# Patient Record
Sex: MALE | Race: WHITE | NOT HISPANIC OR LATINO | Employment: OTHER | URBAN - METROPOLITAN AREA
[De-identification: names, ages, dates, MRNs, and addresses within clinical notes are randomized per-mention and may not be internally consistent; named-entity substitution may affect disease eponyms.]

---

## 2024-08-06 ENCOUNTER — TRANSFERRED RECORDS (OUTPATIENT)
Dept: HEALTH INFORMATION MANAGEMENT | Facility: CLINIC | Age: 64
End: 2024-08-06

## 2024-08-06 LAB — RETINOPATHY: NEGATIVE

## 2024-08-07 ENCOUNTER — TELEPHONE (OUTPATIENT)
Dept: CALL CENTER | Age: 64
End: 2024-08-07

## 2024-08-07 NOTE — TELEPHONE ENCOUNTER
From the payor Grid we do take [place] Medicaid    Shadia is waiting a call from Cuco's insurance to see if he is in network     Madeleine Davila Communication Facilitator on 8/7/2024 at 2:26 PM

## 2024-08-07 NOTE — TELEPHONE ENCOUNTER
[facility] Call Center    Phone Message    May a detailed message be left on voicemail: yes     Reason for Call: Other: Patients sister Shadia calling in to see Dr Wong or Dr Butler accepts [place] Medicaid. Patients eye provider would like to send patient to us for scratched cornea with possible surgery or transplant.     Please call Shadia back at [phone]. Thank you.     Action Taken: Message routed to:  [facility]    Travel Screening: Not Applicable

## 2024-08-15 ENCOUNTER — TRANSCRIBE ORDERS (OUTPATIENT)
Dept: OTHER | Age: 64
End: 2024-08-15

## 2024-08-15 DIAGNOSIS — H04.123 DRY EYES: ICD-10-CM

## 2024-08-15 DIAGNOSIS — H17.9 CORNEAL SCARRING: Primary | ICD-10-CM

## 2024-08-21 ENCOUNTER — TRANSFERRED RECORDS (OUTPATIENT)
Dept: HEALTH INFORMATION MANAGEMENT | Facility: CLINIC | Age: 64
End: 2024-08-21

## 2024-08-22 ENCOUNTER — TELEPHONE (OUTPATIENT)
Dept: OPHTHALMOLOGY | Facility: CLINIC | Age: 64
End: 2024-08-22

## 2024-08-22 NOTE — TELEPHONE ENCOUNTER
[facility] Call Center    Phone Message    May a detailed message be left on voicemail: yes     Reason for Call: Other: Pt's sister called in regards to pt's upcoming appt. She is wondering about follow up appts, surgery, etc. Due to [place] of [place]. Please call sister  back to help answer her questions     Action Taken: Other: P EYE    Travel Screening: Not Applicable     Date of Service:

## 2024-08-23 ENCOUNTER — MEDICAL CORRESPONDENCE (OUTPATIENT)
Dept: HEALTH INFORMATION MANAGEMENT | Facility: CLINIC | Age: 64
End: 2024-08-23

## 2024-08-23 NOTE — TELEPHONE ENCOUNTER
Pt sees Dr. Gutierrez for cornea concern/decrease vision for a year.    Pt states next step maybe cornea transplant and has appt with Dr. Wong September 30th.    Pt would like to have surgery while here for the visit within 1-2 weeks.    Reviewed would not be able to schedule surgery prior to exam and not likely able to add non-urgent/emergent surgery on.    Reviewed if surgery recommended in operating room- may review adding on with provider at time of visit.    Reviewed would forward to facilitator to review request-- pt would like team to be aware of surgical request.    Pt flying in from [facility] and has sister to stay with that lives locally.    Clyde Matos, RN 9:56 AM 08/23/24

## 2024-08-23 NOTE — TELEPHONE ENCOUNTER
[facility] Call Center    Phone Message    May a detailed message be left on voicemail: yes     Reason for Call: Other: Patient's sister Shadia called back after message that was left with Cuco this morning.  She believes her questions were misinterpreted and is asking to speak with the care team at [phone].  Thank you !     Action Taken: Message routed to:  [facility]     Travel Screening: Not Applicable     Date of Service:

## 2024-08-27 ENCOUNTER — TRANSFERRED RECORDS (OUTPATIENT)
Dept: HEALTH INFORMATION MANAGEMENT | Facility: CLINIC | Age: 64
End: 2024-08-27

## 2024-08-30 ENCOUNTER — TRANSFERRED RECORDS (OUTPATIENT)
Dept: HEALTH INFORMATION MANAGEMENT | Facility: CLINIC | Age: 64
End: 2024-08-30

## 2024-08-30 LAB
EJECTION FRACTION: 72 %
EJECTION FRACTION: NORMAL %

## 2024-09-04 NOTE — TELEPHONE ENCOUNTER
[facility] Call Center    Phone Message    May a detailed message be left on voicemail: yes     Reason for Call: Other: Patient's sister is calling back to check on status of encounter sent on 8/23.  She is asking for a call back at  [phone].  Thank you!      Action Taken: Message routed to:  [facility]     Travel Screening: Not Applicable     Date of Service:

## 2024-09-04 NOTE — TELEPHONE ENCOUNTER
Called and spoke to patient sister. Reviewed would not be able to schedule surgery prior to exam and not likely able to add non-urgent/emergent surgery on without exam .     Reviewed if surgery is recommended will add on if not emergent surgical scheduler will call and schedule surgery. Let her know that  is booking out for surgery.     No records on file for patient let sister know she will contact referring clinic and have them send over records.    Answered all question sister had.      Shazia dodd  9/4/2024 5:28 PM

## 2024-09-20 ENCOUNTER — TELEPHONE (OUTPATIENT)
Dept: OPHTHALMOLOGY | Facility: CLINIC | Age: 64
End: 2024-09-20
Payer: MEDICAID

## 2024-09-20 NOTE — TELEPHONE ENCOUNTER
[facility] Call Center    Phone Message    May a detailed message be left on voicemail: yes     Reason for Call: Other: Patient calling back to check on status if Dr Wong is registered with [place] Medicaid. He received a letter from insurance that said we're in network but Dr Wong is not registered [place] Medicaid and to register.    Refer back to  on 8/7 and call patient back at [phone]. Thank you.    Action Taken: Message routed to:  [facility]    Travel Screening: Not Applicable

## 2024-09-24 NOTE — TELEPHONE ENCOUNTER
[facility] Call [facility]    Phone Message    May a detailed message be left on voicemail: yes     Reason for Call: Other: Patient called checking to see if there has been an update on whether any information has been found for Dr. Wong being registered with [place] Medicaid. He would like a call back as soon as possible to discuss. Please advise.      Action Taken: Message routed to:  [facility]    Travel Screening: Not Applicable

## 2024-09-25 NOTE — TELEPHONE ENCOUNTER
"Spoke with sister-told her Prior Auth has gone thru.  I was told that \"Good afternoon!         It is my understanding, after services are rendered, the [place] will be [place] with the authorization information on the claim with a provider enrollment application attached to initiate the process of provider enrollment.         Thank you!\"    Sent this note via email to [email or web address]  "

## 2024-09-30 ENCOUNTER — DOCUMENTATION ONLY (OUTPATIENT)
Dept: OTHER | Facility: CLINIC | Age: 64
End: 2024-09-30

## 2024-09-30 ENCOUNTER — OFFICE VISIT (OUTPATIENT)
Dept: OPHTHALMOLOGY | Facility: CLINIC | Age: 64
End: 2024-09-30
Attending: OPTOMETRIST
Payer: MEDICAID

## 2024-09-30 DIAGNOSIS — H02.202 LAGOPHTHALMOS OF BOTH LOWER EYELIDS: ICD-10-CM

## 2024-09-30 DIAGNOSIS — H17.9 CORNEAL SCAR AND OPACITY: ICD-10-CM

## 2024-09-30 DIAGNOSIS — H04.123 CHRONICALLY DRY EYES, BILATERAL: Primary | ICD-10-CM

## 2024-09-30 DIAGNOSIS — H02.205 LAGOPHTHALMOS OF BOTH LOWER EYELIDS: ICD-10-CM

## 2024-09-30 DIAGNOSIS — H02.59 FLOPPY EYELID SYNDROME OF BOTH EYES: ICD-10-CM

## 2024-09-30 DIAGNOSIS — H16.213 EXPOSURE KERATOPATHY, BILATERAL: ICD-10-CM

## 2024-09-30 PROCEDURE — 99204 OFFICE O/P NEW MOD 45 MIN: CPT | Mod: 25 | Performed by: OPHTHALMOLOGY

## 2024-09-30 PROCEDURE — 68761 CLOSE TEAR DUCT OPENING: CPT | Performed by: OPHTHALMOLOGY

## 2024-09-30 PROCEDURE — G0463 HOSPITAL OUTPT CLINIC VISIT: HCPCS | Performed by: OPHTHALMOLOGY

## 2024-09-30 RX ORDER — LISINOPRIL AND HYDROCHLOROTHIAZIDE 12.5; 2 MG/1; MG/1
1 TABLET ORAL DAILY
COMMUNITY

## 2024-09-30 RX ORDER — LEVOTHYROXINE SODIUM 25 UG/1
25 TABLET ORAL DAILY
COMMUNITY

## 2024-09-30 RX ORDER — METOPROLOL TARTRATE 50 MG
50 TABLET ORAL 2 TIMES DAILY
COMMUNITY

## 2024-09-30 RX ORDER — AMLODIPINE BESYLATE AND ATORVASTATIN CALCIUM 2.5; 4 MG/1; MG/1
1 TABLET, FILM COATED ORAL DAILY
COMMUNITY

## 2024-09-30 ASSESSMENT — EXTERNAL EXAM - LEFT EYE: OS_EXAM: NORMAL

## 2024-09-30 ASSESSMENT — REFRACTION_WEARINGRX
OS_SPHERE: -4.50
OD_CYLINDER: +1.50
OS_CYLINDER: +1.50
OD_ADD: +3.00
OD_SPHERE: -0.75
OS_AXIS: 105
OS_ADD: +3.00
OD_AXIS: 028
SPECS_TYPE: PAL

## 2024-09-30 ASSESSMENT — EXTERNAL EXAM - RIGHT EYE: OD_EXAM: NORMAL

## 2024-09-30 ASSESSMENT — VISUAL ACUITY
OS_PH_CC: 20/125
CORRECTION_TYPE: GLASSES
OD_CC: 20/150
METHOD: SNELLEN - LINEAR
OD_PH_CC+: -1
OD_PH_CC: 20/80
OS_CC: 20/200

## 2024-09-30 ASSESSMENT — TONOMETRY
OD_IOP_MMHG: 21
IOP_METHOD: TONOPEN
OS_IOP_MMHG: 25

## 2024-09-30 NOTE — NURSING NOTE
"Chief Complaints and History of Present Illnesses   Patient presents with    Corneal Evaluation     Chief Complaint(s) and History of Present Illness(es)       Corneal Evaluation              Associated symptoms: dryness and eye pain (sore due to dryness).  Negative for flashes and floaters    Treatments tried: artificial tears and ointment              Comments    He notes discomfort due to dry eyes. He mostly has cloudy/dense fog vision all day per pt. He can see better on bright, mehran days. Pt notes that his eyelids were sewn shut due to corneal abrasions in 2009 which he says worked well.     Pt uses Genteal or Soothe gel at night.   Restasis BID each eye  Refresh Plus and Systane PRN each eye  Timolol-Dorzolamide BID each eye  Iyuzeh at bedtime each eye    No results found for: \"A1C\" Last A1C 8.1 a month ago. His last BS was about 115 on Friday.     Leesa HAGEN 11:28 AM September 30, 2024                        "

## 2024-09-30 NOTE — PROGRESS NOTES
"Chief complaint   Corneal scarring, referral    HPI    Cuco Travis 64 year old male     Referred by Dr. Elier Lew. Last seen on 8/6/24. Patient is s/p AMT right eye x2 with minimal improvement in ocular surface.     Interval hx 09/30/2024  Chief Complaint(s) and History of Present Illness(es)       Corneal Evaluation    Associated symptoms include dryness and eye pain (sore due to dryness).  Negative for flashes and floaters.  Treatments tried include artificial tears and ointment.             Comments    He notes discomfort due to dry eyes. He mostly has cloudy/dense fog vision all day per pt. He can see better on bright, mehran days. Pt notes that his eyelids were sewn shut due to corneal abrasions in 2009 which he says worked well.     Pt uses Genteal or Soothe gel at night.   Restasis BID each eye  Refresh Plus and Systane PRN each eye  Timolol-Dorzolamide BID each eye  Iyuzeh at bedtime each eye    No results found for: \"A1C\" Last A1C 8.1 a month ago. His last BS was about 115 on Friday.     Leesa Hagen OA 11:28 AM September 30, 2024                          Past ocular history   Chronic dry eye syndrome both eyes   Floppy eyelid syndrome both eyes   Primary open angle glaucoma (POAG) both eyes   Pseudophakia  Medicamentosa both eyes     History of \"cornea infection\" in 2008 that resulted from corneal abrasions. Required temporary tarso BOTH EYES, antibiotics, and steroid eyedrops    Prior eye surgery/laser/Trauma:    S/p trabeculectomy right eye (2009), Dr. Pisano  S/p trabeculectomy left eye with mitomycin (2010), Dr. Pisano  S/p CEIOL both eyes (2007) done at [facility] in [place], [place]    CTL wearer:No  Glasses : yes - bifocals  Family Hx of eye disease:    Father with glaucoma, macular degeneration, and detached retina   Maternal aunt with retinal detachment       PMH     Past Medical History:   Diagnosis Date    Diabetes (H)     Disorder of thyroid     HTN (hypertension)        PSH     Past " Surgical History:   Procedure Laterality Date    ANKLE SURGERY  1996    crushed with forklift    CATARACT IOL, RT/LT      HERNIA REPAIR  2004    TRABECULECTOMY         Meds     Current Outpatient Medications   Medication Sig Dispense Refill    amLODIPine-atorvastatin (CADUET) 2.5-40 MG tablet Take 1 tablet by mouth daily. Does not know dose      dulaglutide (TRULICITY) 0.75 MG/0.5ML pen Inject 0.75 mg subcutaneously every 7 days. 3.0mg qweekly      Empagliflozin (JARDIANCE PO) Take by mouth.      levothyroxine (SYNTHROID/LEVOTHROID) 25 MCG tablet Take 25 mcg by mouth daily. Pt unsure of doseage      lisinopril-hydrochlorothiazide (ZESTORETIC) 20-12.5 MG tablet Take 1 tablet by mouth daily.      metFORMIN (GLUCOPHAGE) 1000 MG tablet Take 1,000 mg by mouth 2 times daily (with meals).      metoprolol tartrate (LOPRESSOR) 50 MG tablet Take 50 mg by mouth 2 times daily. 100mg am, 50 mg qhs       No current facility-administered medications for this visit.       Labs   None    Drops Currently Taking   Genteal or Soothe gel at night.   Restasis BID each eye  Refresh Plus and Systane 3-5x/day PRN each eye  PF Timolol-Dorzolamide BID each eye  PF Lyuzeh at bedtime each eye    Assessment/Plan 09/30/2024   # Corneal neovascularization  Likely secondary to severe dry eye and medicamentosa from glaucoma drops (recently PF drops mid August 2024).   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    Plan:   -Continue Restasis twice a day both eyes   -Continue PFATs but increase to every two hours both eyes   -Continue genteal gel at night both eyes   -Switched to PF glaucoma drops August 2024 in case of medicamentosa, continue  -Recommend punctal plugs BLL -   -Discussed possible scleral lens OU - patient hesitant to try because of maintenance involved  -Discussed repair of lag ophthalmos and floppy eyelids OU - refer to oculoplastics  -will benefit from future SK with possible AMT vs DMAK for  corneal scarring after lid resolved     # Primary open angle glaucoma (POAG), both eyes   S/p trabeculectomy right eye (2009), Dr. Pisano  S/p trabeculectomy left eye with mitomycin (2010), Dr. Pisano  Intraocular pressure today 21/25  -Continue PF dorzolamide timolol twice a day both eyes   -Continue Lyuzeh at bedtime both eyes   -Continue to follow at [facility]     Follow up:  Oph:      Donovan Owens MD  Resident Physician, PGY-3  Department of Ophthalmology     Attending Physician Attestation:  Complete documentation of historical and exam elements from today's encounter can be found in the full encounter summary report (not reduplicated in this progress note).  I personally obtained the chief complaint(s) and history of present illness.  I confirmed and edited as necessary the review of systems, past medical/surgical history, family history, social history, and examination findings as documented by others; and I examined the patient myself.  I personally reviewed the relevant tests, images, and reports as documented above.  I formulated and edited as necessary the assessment and plan and discussed the findings and management plan with the patient and family. I was present for the entire procedure.  - Derik Wong MD

## 2024-10-03 ENCOUNTER — TELEPHONE (OUTPATIENT)
Dept: OPHTHALMOLOGY | Facility: CLINIC | Age: 64
End: 2024-10-03
Payer: MEDICAID

## 2024-10-03 NOTE — TELEPHONE ENCOUNTER
"Spoke with Shadia has power of  for patient, informed Shadia to make sure a \"Consent to communicate\" is filled out at visit on 10/07/24 as we do not have one in Chart as of 10/3/24. Scheduled patient as offered for : \"Eval for repair of lag ophthalmos and floppy eyelids BE\"-Per . Provided appointment details over the phone and Eye Clinic contact numbers. -Per Patient's Sister, Shadia   "

## 2024-10-03 NOTE — TELEPHONE ENCOUNTER
FUTURE VISIT INFORMATION      FUTURE VISIT INFORMATION:  Date: 10/7/24  Time: 12:15pm  Location: [facility]  REFERRAL INFORMATION:  Referring provider:     Referring providers clinic:  [facility]  Reason for visit/diagnosis  Eval for repair of lag ophthalmos and floppy eyelids BE    RECORDS REQUESTED FROM:       Clinic name Comments Records Status Imaging Status   [facility] OV/referral 9/30/24 epic

## 2024-10-07 ENCOUNTER — OFFICE VISIT (OUTPATIENT)
Dept: OPHTHALMOLOGY | Facility: CLINIC | Age: 64
End: 2024-10-07
Payer: MEDICAID

## 2024-10-07 ENCOUNTER — PRE VISIT (OUTPATIENT)
Dept: OPHTHALMOLOGY | Facility: CLINIC | Age: 64
End: 2024-10-07

## 2024-10-07 ENCOUNTER — TELEPHONE (OUTPATIENT)
Dept: OPHTHALMOLOGY | Facility: CLINIC | Age: 64
End: 2024-10-07

## 2024-10-07 DIAGNOSIS — H02.135 SENILE ECTROPION OF BOTH LOWER EYELIDS: ICD-10-CM

## 2024-10-07 DIAGNOSIS — H02.59 FLOPPY EYELID SYNDROME OF BOTH EYES: Primary | ICD-10-CM

## 2024-10-07 DIAGNOSIS — H02.422 MYOGENIC PTOSIS OF LEFT EYELID: ICD-10-CM

## 2024-10-07 DIAGNOSIS — H02.132 SENILE ECTROPION OF BOTH LOWER EYELIDS: ICD-10-CM

## 2024-10-07 PROCEDURE — 92285 EXTERNAL OCULAR PHOTOGRAPHY: CPT | Mod: GC | Performed by: OPHTHALMOLOGY

## 2024-10-07 PROCEDURE — 99214 OFFICE O/P EST MOD 30 MIN: CPT | Mod: 24 | Performed by: OPHTHALMOLOGY

## 2024-10-07 ASSESSMENT — REFRACTION_WEARINGRX
OS_SPHERE: -4.50
OS_CYLINDER: +1.50
OD_ADD: +3.00
OD_CYLINDER: +1.50
OS_AXIS: 105
OD_AXIS: 028
OS_ADD: +3.00
SPECS_TYPE: PAL
OD_SPHERE: -0.75

## 2024-10-07 ASSESSMENT — TONOMETRY
IOP_METHOD: ICARE
OS_IOP_MMHG: 30
OD_IOP_MMHG: 20
OS_IOP_MMHG: 31
IOP_METHOD: ICARE

## 2024-10-07 ASSESSMENT — VISUAL ACUITY
OD_CC: 20/70
METHOD: SNELLEN - LINEAR
CORRECTION_TYPE: GLASSES
OS_CC: 20/125
OD_CC+: -1

## 2024-10-07 ASSESSMENT — MARGIN REFLEX DISTANCE
OD_MRD1: 5
OS_MRD1: 2

## 2024-10-07 ASSESSMENT — LAGOPHTHALMOS
OS_LAGOPHTHALMOS: 0
OD_LAGOPHTHALMOS: 0

## 2024-10-07 NOTE — TELEPHONE ENCOUNTER
[facility] Call Center    Phone Message    May a detailed message be left on voicemail: yes     Reason for Call: Other: Patient's sister is calling to ask Dr. Wong's office if patient can have some of the scarring removed in the next ten days for a temporary fix and then schedule the surgery later.  She is concerned about there not being enough IV bags for surgery.  She is asking for a call back to discuss at [phone].  Thank you!      Action Taken: Message routed to:  [facility]     Travel Screening: Not Applicable     Date of Service:

## 2024-10-07 NOTE — LETTER
10/7/2024         RE:  :  MRN: Cuco Travis  1960  [id number]     Dear Dr. Derik Wong,    Thank you for asking me to see your patient, Cuco Travis, for an oculoplastic   consultation.  My assessment and plan are below.  For further details, please see my attached clinic note.      Assessment & Plan     Cuco Travis is a 64 year old male with the following diagnoses:   1. Floppy eyelid syndrome of both eyes    2. Senile ectropion of both lower eyelids    3. Myogenic ptosis of left eyelid           Referred by Dr. Wong for lagophthalmos and floppy eyelids.  He has corneal neovascularization both eyes thought secondary to severe dry eye and medicamentosa from glaucoma drops.  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 Examination showed significant laxity of both eyelids with bilateral ectropion. His ocular surfaces appear significantly irritated, as described by Dr. Wong. Suspect that he would benefit from bilateral lateral tarsal strip.     He had a sleep study and is awaiting the results of this.     No heart attack, stroke, or blood clots. No anticoagulation.     Plan:  Bilateral lower lids LATERAL TARSAL STRIP + PATRICIA FLAP  Ptosis left upper lid later if stable           Again, thank you for allowing me to participate in the care of your patient.      Sincerely,    Damian Recio MD  Department of Ophthalmology and Visual Neurosciences  [facility]    CC: Derik Wong MD  [address]  [place] [place] [address]  Via In Basket

## 2024-10-07 NOTE — NURSING NOTE
"Chief Complaints and History of Present Illnesses   Patient presents with    Consult For     Cuco Travis is being seen for a consult today by the request of Dr. Wong for lagophthalmos and floppy eyelids OU.      Chief Complaint(s) and History of Present Illness(es)       Consult For              Associated symptoms: dryness and redness.  Negative for eye pain    Treatments tried: eye drops and artificial tears    Comments: Cuco Travis is being seen for a consult today by the request of Dr. Wong for lagophthalmos and floppy eyelids OU.               Comments    Pt feels left eye is drooping more than right eye   Vision both eyes is blurry due to dryness.   Compliant with eyedrops.    Ocular Meds:  Restasis BID each eye  Refresh Plus and Systane AT PRN each eye  PF Dorzolamide Timolol BID each eye   PF Latanaprost at bedtime each eye     DM2  LBS : 115 about a week ago  No results found for: \"A1C\"     Gio Brown 11:50 AM October 7, 2024                      "

## 2024-10-07 NOTE — PROGRESS NOTES
"Chief Complaints and History of Present Illnesses   Patient presents with    Consult For     Cuco Travis is being seen for a consult today by the request of Dr. Wong for lagophthalmos and floppy eyelids OU.      Chief Complaint(s) and History of Present Illness(es)     Consult For    Associated symptoms include dryness and redness.  Negative for eye pain.    Treatments tried include eye drops and artificial tears. Additional   comments: Cuco Travis is being seen for a consult today by the request   of Dr. Wong for lagophthalmos and floppy eyelids OU.            Comments    Pt feels left eye is drooping more than right eye   Vision both eyes is blurry due to dryness.   Compliant with eyedrops.    Ocular Meds:  Restasis BID each eye  Refresh Plus and Systane AT PRN each eye  PF Dorzolamide Timolol BID each eye   PF Latanaprost at bedtime each eye     DM2  LBS : 115 about a week ago  No results found for: \"A1C\"     Gio Ho 11:50 AM October 7, 2024        He describes significant blurring of both eyes. He has occasional burning and itching of the eyes. He has dry eyes fairly often. He doesn't have significant tearing.                Assessment & Plan     Cuco Travis is a 64 year old male with the following diagnoses:   1. Floppy eyelid syndrome of both eyes    2. Senile ectropion of both lower eyelids    3. Myogenic ptosis of left eyelid           Referred by Dr. Wong for lagophthalmos and floppy eyelids.  He has corneal neovascularization both eyes thought secondary to severe dry eye and medicamentosa from glaucoma drops.  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 Examination showed significant laxity of both eyelids with bilateral ectropion. His ocular surfaces appear significantly irritated, as described by Dr. Wong. Suspect that he would benefit from bilateral lateral tarsal strip.     He had a sleep study and is awaiting the " results of this.     No heart attack, stroke, or blood clots. No anticoagulation.     Plan:  Bilateral lower lids LATERAL TARSAL STRIP + PATRICIA FLAP  Ptosis left upper lid later if stable             Wilton Barnett MD  Resident Physician, PGY-2  Department of Ophthalmology  10/07/2024 12:44 PM     Attending Physician Attestation:  Complete documentation of historical and exam elements from today's encounter can be found in the full encounter summary report (not reduplicated in this progress note).  I personally obtained the chief complaint(s) and history of present illness.  I confirmed and edited as necessary the review of systems, past medical/surgical history, family history, social history, and examination findings as documented by others; and I examined the patient myself.  I personally reviewed the relevant tests, images, and reports as documented above.  I formulated and edited as necessary the assessment and plan and discussed the findings and management plan with the patient and family. I personally reviewed the ophthalmic test(s) associated with this encounter, agree with the interpretation(s) as documented by the resident/fellow, and have edited the corresponding report(s) as necessary.   -Damian Recio MD  12:25 PM 10/7/2024    Today with Cuco Travis  and his sister, I reviewed the indications, risks, benefits, and alternatives of the proposed surgical procedure including, but not limited to, failure obtain the desired result  and need for additional surgery, bleeding, infection, loss of vision, loss of the eye, and the remote possibility of permanent damage to any organ system or death with the use of anesthesia.  I provided multiple opportunities for the questions, answered all questions to the best of my ability, and confirmed that my answers and my discussion were understood.   - Damian Recio MD 1:04 PM 10/7/2024

## 2024-10-08 NOTE — TELEPHONE ENCOUNTER
Spoke to sister at 1405 primary health care agent.    Concern that not able to perform lid surgery as previously planned for tomorrow secondary to IV bag fluid shortage.    Sister would like to review possible surgery with Dr. Wong if the lid surgery will be scheduled out further now.    Pt staying in town currently for procedure, but will need to go home ([facility]) if surgery delayed for period of time.    -- per review of last Dr. Wong Note: superficial keratectomy vs DMAK after lid resolved.    Per information I have received [facility] [place] not using conservation measures for IV bag fluid.    I am not aware surgery center holding on surgeries at this time/rescheduling if not considered urgent/emergent.    Sister also wondering if procedure paperwork was sent to [place] medicaid.    I will forward to Care Coordinator and surgery scheduling team to review/finalize surgery date with Dr. Recio.    After surgery scheduled, we can re-review plan for cornea procedure.    Sister seemed comfortable with information.    Clyde Matos RN 2:13 PM 10/08/24

## 2024-10-09 ENCOUNTER — TELEPHONE (OUTPATIENT)
Dept: OPHTHALMOLOGY | Facility: CLINIC | Age: 64
End: 2024-10-09
Payer: MEDICAID

## 2024-10-09 PROBLEM — H02.135 SENILE ECTROPION OF BOTH LOWER EYELIDS: Status: ACTIVE | Noted: 2024-10-07

## 2024-10-09 PROBLEM — H02.59 FLOPPY EYELID SYNDROME OF BOTH EYES: Status: ACTIVE | Noted: 2024-10-07

## 2024-10-09 PROBLEM — H02.132 SENILE ECTROPION OF BOTH LOWER EYELIDS: Status: ACTIVE | Noted: 2024-10-07

## 2024-10-09 NOTE — TELEPHONE ENCOUNTER
Called patient to schedule surgery with Dr. Recio    Spoke with: Shadia    Date(s) of Surgery: 11/6    Patient aware of approximate arrival time: Yes      [facility] of surgery: [facility]     Pre-Op H&P: [facility]     Informed patient that they need to call to schedule pre-op H&P within 30 days of surgery date: Yes      Post-Op Appt Dates: 11/18       Discussed with patient pre-op RN will call 2-3 days prior to surgery with arrival time and instructions:  Yes       Standard Surgery Packet Sent: Yes 10/09/24  via Mail - Standard      Additional Information Sent in Packet:  NA       Informed patient that they will need an adult  to bring patient home from surgery: Yes  : Shadia         Additional Comments:  NA      All patients questions were answered and was instructed to review surgical packet and call back [phone] with any questions or concerns.       Kezia Rider on 10/9/2024 at 9:38 AM

## 2024-10-10 NOTE — TELEPHONE ENCOUNTER
Pt's lid surgery November 6th and recommendation from Dr. Wong for lid surgery prior to any cornea procedure.      Shadia Travis [phone]     Left message at 0800 and reviewed Dr. Wong's recommendation to hold on cornea procedure until lid surgery performed    Reviewed may reach out after lid surgery to review plan for cornea procedure with Dr. Wong.    Clyde Matos RN 8:02 AM 10/10/24

## 2024-10-11 NOTE — TELEPHONE ENCOUNTER
[facility] Call Center    Phone Message    May a detailed message be left on voicemail: yes     Reason for Call: Other: Shadia is calling back to ask about the heal time from surgery with Dr. Recio and how long before Dr. Wong would be will to do the cornea scraping?  She is asking for a call back at [phone].  Thank you!      Action Taken: Message routed to:  [facility]     Travel Screening: Not Applicable     Date of Service:

## 2024-10-14 NOTE — TELEPHONE ENCOUNTER
[facility] Call Center    Phone Message    May a detailed message be left on voicemail: yes     Reason for Call: Other: Shadia is calling back with pt on the line and they are still requesting a call back to discuss dates due to they need to be scheduling flights please do call Shadia at # [phone] Thank you  Caller was advised of a month in between Dr Recio and Dr Wong but caller would like to talk with Clyde please   Can the re-exam and scrapping be scheduled now for pts schedule? Caller would like to book those if possible with Dr Wong    Action Taken: Message routed to:  [facility]    Travel Screening: Not Applicable     Date of Service:

## 2024-10-22 ENCOUNTER — TELEPHONE (OUTPATIENT)
Dept: OPHTHALMOLOGY | Facility: CLINIC | Age: 64
End: 2024-10-22
Payer: MEDICAID

## 2024-10-22 NOTE — TELEPHONE ENCOUNTER
[facility] Call Center    Phone Message    May a detailed message be left on voicemail: yes     Reason for Call: Other: [facility] called requesting pre-op form. They need to know date of surgery, what kind of surgery and if any testing is needed. Labs, EKG etc. Please fax to: [phone].     Action Taken: Other: eye    Travel Screening: Not Applicable

## 2024-10-23 NOTE — TELEPHONE ENCOUNTER
Pt LVM asking about the pre op forms. Writer called back and spoke with Shadia and let them know that writer did fax them over to the doctors office and that they are also included in the surgery packet that was sent via mail to patient on 10/9. Shadia stated that she would follow up with patient. Kezia Rider on 10/23/2024 at 1:55 PM

## 2024-10-24 ENCOUNTER — TRANSFERRED RECORDS (OUTPATIENT)
Dept: HEALTH INFORMATION MANAGEMENT | Facility: CLINIC | Age: 64
End: 2024-10-24
Payer: MEDICAID

## 2024-11-05 ENCOUNTER — ANESTHESIA EVENT (OUTPATIENT)
Dept: SURGERY | Facility: AMBULATORY SURGERY CENTER | Age: 64
End: 2024-11-05
Payer: MEDICAID

## 2024-11-06 ENCOUNTER — HOSPITAL ENCOUNTER (OUTPATIENT)
Facility: AMBULATORY SURGERY CENTER | Age: 64
Discharge: HOME | End: 2024-11-06
Attending: OPHTHALMOLOGY
Payer: MEDICAID

## 2024-11-06 ENCOUNTER — ANESTHESIA (OUTPATIENT)
Dept: SURGERY | Facility: AMBULATORY SURGERY CENTER | Age: 64
End: 2024-11-06
Payer: MEDICAID

## 2024-11-06 VITALS
HEIGHT: 67 IN | DIASTOLIC BLOOD PRESSURE: 76 MMHG | TEMPERATURE: 98.5 F | WEIGHT: 277 LBS | SYSTOLIC BLOOD PRESSURE: 109 MMHG | RESPIRATION RATE: 18 BRPM | OXYGEN SATURATION: 92 % | BODY MASS INDEX: 43.47 KG/M2 | HEART RATE: 86 BPM

## 2024-11-06 DIAGNOSIS — Z98.890 POSTOPERATIVE EYE STATE: Primary | ICD-10-CM

## 2024-11-06 LAB — GLUCOSE BLDC GLUCOMTR-MCNC: 169 MG/DL (ref 70–99)

## 2024-11-06 PROCEDURE — 82962 GLUCOSE BLOOD TEST: CPT | Performed by: PATHOLOGY

## 2024-11-06 PROCEDURE — 67914 REPAIR EYELID DEFECT: CPT | Performed by: ANESTHESIOLOGY

## 2024-11-06 PROCEDURE — 67914 REPAIR EYELID DEFECT: CPT | Performed by: NURSE ANESTHETIST, CERTIFIED REGISTERED

## 2024-11-06 RX ORDER — NEOMYCIN POLYMYXIN B SULFATES AND DEXAMETHASONE 3.5; 10000; 1 MG/ML; [USP'U]/ML; MG/ML
SUSPENSION/ DROPS OPHTHALMIC
Qty: 5 ML | Refills: 0 | Status: SHIPPED | OUTPATIENT
Start: 2024-11-06

## 2024-11-06 RX ORDER — OXYCODONE HYDROCHLORIDE 5 MG/1
10 TABLET ORAL
Status: DISCONTINUED | OUTPATIENT
Start: 2024-11-06 | End: 2024-11-07 | Stop reason: HOSPADM

## 2024-11-06 RX ORDER — LIDOCAINE 40 MG/G
CREAM TOPICAL
Status: DISCONTINUED | OUTPATIENT
Start: 2024-11-06 | End: 2024-11-07 | Stop reason: HOSPADM

## 2024-11-06 RX ORDER — FENTANYL CITRATE 50 UG/ML
50 INJECTION, SOLUTION INTRAMUSCULAR; INTRAVENOUS EVERY 5 MIN PRN
Status: DISCONTINUED | OUTPATIENT
Start: 2024-11-06 | End: 2024-11-07 | Stop reason: HOSPADM

## 2024-11-06 RX ORDER — ONDANSETRON 2 MG/ML
4 INJECTION INTRAMUSCULAR; INTRAVENOUS EVERY 30 MIN PRN
Status: DISCONTINUED | OUTPATIENT
Start: 2024-11-06 | End: 2024-11-07 | Stop reason: HOSPADM

## 2024-11-06 RX ORDER — LIDOCAINE HYDROCHLORIDE AND EPINEPHRINE 10; 10 MG/ML; UG/ML
INJECTION, SOLUTION INFILTRATION; PERINEURAL PRN
Status: DISCONTINUED | OUTPATIENT
Start: 2024-11-06 | End: 2024-11-06 | Stop reason: HOSPADM

## 2024-11-06 RX ORDER — ERYTHROMYCIN 5 MG/G
OINTMENT OPHTHALMIC
Qty: 3.5 G | Refills: 1 | Status: SHIPPED | OUTPATIENT
Start: 2024-11-06

## 2024-11-06 RX ORDER — HYDROMORPHONE HYDROCHLORIDE 1 MG/ML
0.2 INJECTION, SOLUTION INTRAMUSCULAR; INTRAVENOUS; SUBCUTANEOUS EVERY 5 MIN PRN
Status: DISCONTINUED | OUTPATIENT
Start: 2024-11-06 | End: 2024-11-07 | Stop reason: HOSPADM

## 2024-11-06 RX ORDER — NALOXONE HYDROCHLORIDE 0.4 MG/ML
0.1 INJECTION, SOLUTION INTRAMUSCULAR; INTRAVENOUS; SUBCUTANEOUS
Status: DISCONTINUED | OUTPATIENT
Start: 2024-11-06 | End: 2024-11-07 | Stop reason: HOSPADM

## 2024-11-06 RX ORDER — ONDANSETRON 4 MG/1
4 TABLET, ORALLY DISINTEGRATING ORAL EVERY 30 MIN PRN
Status: DISCONTINUED | OUTPATIENT
Start: 2024-11-06 | End: 2024-11-07 | Stop reason: HOSPADM

## 2024-11-06 RX ORDER — DEXMEDETOMIDINE HYDROCHLORIDE 4 UG/ML
INJECTION, SOLUTION INTRAVENOUS PRN
Status: DISCONTINUED | OUTPATIENT
Start: 2024-11-06 | End: 2024-11-06

## 2024-11-06 RX ORDER — KETAMINE HYDROCHLORIDE 10 MG/ML
INJECTION INTRAMUSCULAR; INTRAVENOUS PRN
Status: DISCONTINUED | OUTPATIENT
Start: 2024-11-06 | End: 2024-11-06

## 2024-11-06 RX ORDER — LIDOCAINE HYDROCHLORIDE 20 MG/ML
INJECTION, SOLUTION INFILTRATION; PERINEURAL PRN
Status: DISCONTINUED | OUTPATIENT
Start: 2024-11-06 | End: 2024-11-06

## 2024-11-06 RX ORDER — FENTANYL CITRATE 50 UG/ML
25 INJECTION, SOLUTION INTRAMUSCULAR; INTRAVENOUS EVERY 5 MIN PRN
Status: DISCONTINUED | OUTPATIENT
Start: 2024-11-06 | End: 2024-11-07 | Stop reason: HOSPADM

## 2024-11-06 RX ORDER — TETRACAINE HYDROCHLORIDE 5 MG/ML
SOLUTION OPHTHALMIC PRN
Status: DISCONTINUED | OUTPATIENT
Start: 2024-11-06 | End: 2024-11-06 | Stop reason: HOSPADM

## 2024-11-06 RX ORDER — DEXAMETHASONE SODIUM PHOSPHATE 10 MG/ML
4 INJECTION, SOLUTION INTRAMUSCULAR; INTRAVENOUS
Status: DISCONTINUED | OUTPATIENT
Start: 2024-11-06 | End: 2024-11-07 | Stop reason: HOSPADM

## 2024-11-06 RX ORDER — PROPOFOL 10 MG/ML
INJECTION, EMULSION INTRAVENOUS PRN
Status: DISCONTINUED | OUTPATIENT
Start: 2024-11-06 | End: 2024-11-06

## 2024-11-06 RX ORDER — SODIUM CHLORIDE, SODIUM LACTATE, POTASSIUM CHLORIDE, CALCIUM CHLORIDE 600; 310; 30; 20 MG/100ML; MG/100ML; MG/100ML; MG/100ML
INJECTION, SOLUTION INTRAVENOUS CONTINUOUS
Status: DISCONTINUED | OUTPATIENT
Start: 2024-11-06 | End: 2024-11-07 | Stop reason: HOSPADM

## 2024-11-06 RX ORDER — OXYCODONE HYDROCHLORIDE 5 MG/1
5 TABLET ORAL EVERY 6 HOURS PRN
Qty: 12 TABLET | Refills: 0 | Status: SHIPPED | OUTPATIENT
Start: 2024-11-06 | End: 2024-11-09

## 2024-11-06 RX ORDER — ERYTHROMYCIN 5 MG/G
OINTMENT OPHTHALMIC PRN
Status: DISCONTINUED | OUTPATIENT
Start: 2024-11-06 | End: 2024-11-06 | Stop reason: HOSPADM

## 2024-11-06 RX ORDER — OXYCODONE HYDROCHLORIDE 5 MG/1
5 TABLET ORAL
Status: DISCONTINUED | OUTPATIENT
Start: 2024-11-06 | End: 2024-11-07 | Stop reason: HOSPADM

## 2024-11-06 RX ORDER — HYDROMORPHONE HYDROCHLORIDE 1 MG/ML
0.4 INJECTION, SOLUTION INTRAMUSCULAR; INTRAVENOUS; SUBCUTANEOUS EVERY 5 MIN PRN
Status: DISCONTINUED | OUTPATIENT
Start: 2024-11-06 | End: 2024-11-07 | Stop reason: HOSPADM

## 2024-11-06 RX ORDER — ACETAMINOPHEN 325 MG/1
975 TABLET ORAL ONCE
Status: COMPLETED | OUTPATIENT
Start: 2024-11-06 | End: 2024-11-06

## 2024-11-06 RX ADMIN — KETAMINE HYDROCHLORIDE 10 MG: 10 INJECTION INTRAMUSCULAR; INTRAVENOUS at 07:59

## 2024-11-06 RX ADMIN — Medication 100 MCG: at 08:31

## 2024-11-06 RX ADMIN — ACETAMINOPHEN 975 MG: 325 TABLET ORAL at 07:10

## 2024-11-06 RX ADMIN — SODIUM CHLORIDE, SODIUM LACTATE, POTASSIUM CHLORIDE, CALCIUM CHLORIDE: 600; 310; 30; 20 INJECTION, SOLUTION INTRAVENOUS at 07:26

## 2024-11-06 RX ADMIN — Medication 100 MCG: at 08:27

## 2024-11-06 RX ADMIN — Medication 100 MCG: at 08:22

## 2024-11-06 RX ADMIN — LIDOCAINE HYDROCHLORIDE 100 MG: 20 INJECTION, SOLUTION INFILTRATION; PERINEURAL at 08:00

## 2024-11-06 RX ADMIN — DEXMEDETOMIDINE HYDROCHLORIDE 10 MCG: 4 INJECTION, SOLUTION INTRAVENOUS at 07:57

## 2024-11-06 RX ADMIN — PROPOFOL 40 MG: 10 INJECTION, EMULSION INTRAVENOUS at 08:00

## 2024-11-06 NOTE — ANESTHESIA CARE TRANSFER NOTE
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    Anesthesia Type:   MAC     Note:    Oropharynx: spontaneously breathing  Level of Consciousness: awake  Oxygen Supplementation: room air    Independent Airway: airway patency satisfactory and stable  Dentition: dentition unchanged  Vital Signs Stable: post-procedure vital signs reviewed and stable  Report to RN Given: handoff report given  Patient transferred to: Phase II    Handoff Report: Identifed the Patient, Identified the Reponsible Provider, Reviewed the pertinent medical history, Discussed the surgical course, Reviewed Intra-OP anesthesia mangement and issues during anesthesia, Set expectations for post-procedure period and Allowed opportunity for questions and acknowledgement of understanding  Vitals:  Vitals Value Taken Time   BP     Temp     Pulse     Resp     SpO2         Electronically Signed By: ION Davis CRNA  November 6, 2024  8:36 AM

## 2024-11-06 NOTE — DISCHARGE INSTRUCTIONS
Post-operative Instructions    Ophthalmic Plastic and Reconstructive Surgery  Damian Recio M.D.  Bridget Mcfadden M.D.    All instructions apply to the operated eye(s) or eyelid(s)      What to expect after surgery:  There will be some swelling, bruising, and likely a black eye (even into the lower eyelids and cheeks). Also expect crusting and discharge from the eye and/or incisions.   A small amount of surface bleeding is normal for the first 48 hours after surgery.  You may notice some bloody tears for the first few days after surgery. This is normal.  Your eye(s) and eyelid(s) may be painful and tender. This is normal after surgery. Use the pain medication as prescribed. If your pain does not improve despite the medication, contact the office.    Wound care and personal care:  Apply ice compresses 15 minutes on 15 minutes off while awake for the first 2 days after surgery, then switch to warm compresses 4 times a day until seen by your physician.   For warm packs you can place a cup of dry uncooked rice in a clean cotton sock. Place sock in microwave 30 seconds to one minute. Next place the warm sock into a plastic bag and wrap the bag with clean warm wet washcloth and place over operated eye.    You may shower or wash your hair the day after surgery. Do not bathe or go swimming for 1 week to prevent contamination of your wounds.    Activity restrictions and driving:  Avoid heavy lifting, bending, exercise or strenuous activity for 1 week after surgery.  You may resume other activities and return to work as tolerated.  You may not resume driving until have you stopped using narcotic pain medications(such as Norco, Percocet, Tylenol #3).    Medications:  Restart all your regular home medications and eye drops today. If you take Plavix or Aspirin on a regular basis, wait for 3 days after your surgery before restarting these in order to decrease the risk of bleeding complications.  Avoid aspirin and  aspirin-like medications (Motrin, Aleve, Ibuprofen, [place] etc) for 5 days to reduce the risk of bleeding. You may take Tylenol (acetaminophen) for pain.  In addition to your home medications, take the following post-operative medications as prescribed by your physician:  Apply antibiotic ointment (erythromycin) to all sutures three times a day, and into the operated eye(s) at night.   Instill eye drops (Maxitrol) four times a day until the bottle finished.   Take scheduled extra strength Tylenol for pain.  You may take 1 to 2 pain pills (norco or oxycodone as prescribed) as needed for breakthrough pain up to every 6 hours.  The pain pills may make you drowsy. You must not drive a car, operate heavy machinery or drink alcohol while taking them.  The pain pills may cause constipation and nausea. Take them with some food to prevent a stomach upset. If you continue to experience nausea, call your physician.    WARNING: All the prescription pain medications listed above contain Tylenol (acetaminophen). You must not take more than 4,000 mg of acetaminophen per 24-hour period. This is equivalent to 6 tablets of Darvocet, 8 tablets of Vicodin, or 12 tablets of Norco, Percocet or Tylenol #3. If you take other over-the-counter medications containing acetaminophen, you must take the amount of acetaminophen into account and reduce the number of prescribed pain pills accordingly.    Contact information and follow-up:  Return to the Eye Clinic for a follow-up appointment with your physician as scheduled. If no appointment has been scheduled, call [phone] for an appointment with Dr. Recio within 1 to 2 weeks from your date of surgery.  -     If your post-operative appointment is a telephone appointment, please email a few photos of your eye(s) or other operative site(s) to [email or web address] prior to your appointment.    For severe pain, bleeding, or loss of vision, call the Eye Clinic at [phone].  After  hours or on weekends and holidays, call [phone] and ask to speak with the ophthalmologist on call.

## 2024-11-06 NOTE — OP NOTE
PREOPERATIVE DIAGNOSIS: Retraction, bilateral lower eyelid    POSTOPERATIVE DIAGNOSIS: Retraction, bilateral lower eyelid    PROCEDURE: Retraction repair with tarsoconjunctival flap, bilateral lower eyelid    ANESTHESIA: Monitored with local infiltration of 1% Lidocaine with epinephrine    SURGEON: Damian Recio MD    ASSISTANT: Bridget Mcfadden MD, JEAN CARLOS    COMPLICATIONS: None    ESTIMATED BLOOD LOSS: None    SPECIMENS: None    HISTORY AND INDICATIONS: Cuco Travis presented with lower lid retraction causing irritation and superficial keratitis. After the risks, benefits and alternatives were explained, informed consent was obtained.    PROCEDURE:  Cuco Travis  was brought to the operating room and placed supine on the operating table. IV sedation was given. The  bilateral upper and lower lid were infiltrated with local anesthetic. Lateral canthus was also infiltrated. The area was prepped and draped in the typical sterile ophthalmic fashion. Attention was directed to the left side. Lateral canthal incision was made with a 15 blade and dissection carried down to the orbicularis with high temperature cautery. Lateral canthotomy and inferior cantholysis was performed. Lateral tarsal strip was fashioned.  Lateral tarsal strip was secured to the lateral orbital rim periosteum with a double-armed 5-0 PDS suture in a horizontal mattress fashion. Lateral canthal angle was closed with a gray line to gray line suture of 6-0 Vicryl suture. The lateral 5mm of grey line was incised with a #15 blade. The posterior lamella epithelium was removed.  A 4-0 silk suture was placed through the upper lid margin. The tarsoconjunctival flap was outlined with the marking pen measuring 5 mm. The flap was incised with the #15 blade and dissected from the underlying tissue. The flap was sewn into the lower eyelid margin with interrupted 5-0 Vicryl sutures.   Antibiotic ointment was applied to the incision and the eye.  Attention was  directed to the right side and the same procedure performed. Cuco Travis tolerated the procedure well and left the operating room in stable condition.      SCARLET NICHOLAS MD

## 2024-11-06 NOTE — ANESTHESIA PREPROCEDURE EVALUATION
Anesthesia Pre-Procedure Evaluation    Patient: Cuco Travis   MRN: [id number] : 1960        Procedure : Procedure(s):  REPAIR, ECTROPION, EYE, BILATERAL LOWER EYELIDS WITH TARSOCONJUNCTIVAL FLAP          Past Medical History:   Diagnosis Date     Diabetes (H)      Disorder of thyroid      HTN (hypertension)       Past Surgical History:   Procedure Laterality Date     ANKLE SURGERY      crushed with forklift     CATARACT IOL, RT/LT       HERNIA REPAIR       TRABECULECTOMY        Allergies   Allergen Reactions     Egg Yolk      Pt is allergic to eggs, however he can them in cakes.       Social History     Tobacco Use     Smoking status: Never     Smokeless tobacco: Never   Substance Use Topics     Alcohol use: Yes      Wt Readings from Last 1 Encounters:   24 125.6 kg (277 lb)        Anesthesia Evaluation   Pt has had prior anesthetic. Type: General.    No history of anesthetic complications       ROS/MED HX  ENT/Pulmonary:     (+) sleep apnea, uses CPAP,                                      Neurologic: Comment: Corneal scarring/poor vision      Cardiovascular:     (+)  hypertension- -   -  - -                                      METS/Exercise Tolerance: 3 - Able to walk 1-2 blocks without stopping Comment: Walks very slow and limited because of his poor vision   Hematologic:  - neg hematologic  ROS     Musculoskeletal:   (+)  arthritis,             GI/Hepatic:     (+) GERD, Asymptomatic on medication,                  Renal/Genitourinary:       Endo:     (+) type I DM,    Not using insulin, - not using insulin pump.  not previously admitted for DM/DKA.  thyroid problem, hypothyroidism,    Obesity,       Psychiatric/Substance Use:  - neg psychiatric ROS     Infectious Disease:       Malignancy:       Other:          Physical Exam    Airway        Mallampati: IV   TM distance: > 3 FB   Neck ROM: full   Mouth opening: > 3 cm    Respiratory Devices and Support         Dental       (+) Multiple  "crowns, permanant bridges      Cardiovascular   cardiovascular exam normal       Rhythm and rate: regular and normal     Pulmonary   pulmonary exam normal        breath sounds clear to auscultation       OUTSIDE LABS:  CBC: No results found for: \"WBC\", \"HGB\", \"HCT\", \"PLT\"  BMP:   Lab Results   Component Value Date     (H) 11/06/2024     COAGS: No results found for: \"PTT\", \"INR\", \"FIBR\"  POC: No results found for: \"BGM\", \"HCG\", \"HCGS\"  HEPATIC: No results found for: \"ALBUMIN\", \"PROTTOTAL\", \"ALT\", \"AST\", \"GGT\", \"ALKPHOS\", \"BILITOTAL\", \"BILIDIRECT\", \"STARLA\"  OTHER: No results found for: \"PH\", \"LACT\", \"A1C\", \"ROSSY\", \"PHOS\", \"MAG\", \"LIPASE\", \"AMYLASE\", \"TSH\", \"T4\", \"T3\", \"CRP\", \"SED\"    Anesthesia Plan    ASA Status:  3    NPO Status:  NPO Appropriate    Anesthesia Type: MAC.     - Reason for MAC: immobility needed              Consents    Anesthesia Plan(s) and associated risks, benefits, and realistic alternatives discussed. Questions answered and patient/representative(s) expressed understanding.     - Discussed:     - Discussed with:  Patient            Postoperative Care    Pain management: Multi-modal analgesia.   PONV prophylaxis: Ondansetron (or other 5HT-3)     Comments:             Monique King MD    I have reviewed the pertinent notes and labs in the chart from the past 30 days and (re)examined the patient.  Any updates or changes from those notes are reflected in this note.               # Hypertension: Home medication list includes antihypertensive(s)           # Severe Obesity: Estimated body mass index is 43.38 kg/m  as calculated from the following:    Height as of this encounter: 1.702 m (5' 7\").    Weight as of this encounter: 125.6 kg (277 lb).             "

## 2024-11-06 NOTE — ANESTHESIA POSTPROCEDURE EVALUATION
Patient: Cuco Travis    Procedure: Procedure(s):  REPAIR, ECTROPION, EYE, BILATERAL LOWER EYELIDS WITH TARSOCONJUNCTIVAL FLAP       Anesthesia Type:  MAC    Note:  Disposition: Outpatient   Postop Pain Control: Uneventful            Sign Out: Well controlled pain   PONV: No   Neuro/Psych: Uneventful            Sign Out: Acceptable/Baseline neuro status   Airway/Respiratory: Uneventful            Sign Out: Acceptable/Baseline resp. status   CV/Hemodynamics: Uneventful            Sign Out: Acceptable CV status; No obvious hypovolemia; No obvious fluid overload   Other NRE: NONE   DID A NON-ROUTINE EVENT OCCUR? No           Last vitals:  Vitals Value Taken Time   /76 11/06/24 0945   Temp 36.9  C (98.5  F) 11/06/24 0945   Pulse 86 11/06/24 0945   Resp 18 11/06/24 0945   SpO2 92 % 11/06/24 0945       Electronically Signed By: Monique King MD  November 6, 2024  10:56 AM

## 2024-11-06 NOTE — BRIEF OP NOTE
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  Complications: None.  Implants: * No implants in log *

## 2024-11-14 ENCOUNTER — TELEPHONE (OUTPATIENT)
Dept: OPHTHALMOLOGY | Facility: CLINIC | Age: 64
End: 2024-11-14
Payer: MEDICAID

## 2024-11-14 NOTE — TELEPHONE ENCOUNTER
Spoke with patient regarding email instructions for sending photos prior to Video POST-OP. Patient has information and direct number for any additional concerns with photos.-Per Patient

## 2024-11-15 ENCOUNTER — TELEPHONE (OUTPATIENT)
Dept: OPHTHALMOLOGY | Facility: CLINIC | Age: 64
End: 2024-11-15
Payer: MEDICAID

## 2024-11-15 NOTE — TELEPHONE ENCOUNTER
Spoke with patient's family member and confirmed we have photos for patient. -Per Patient's Family

## 2024-11-15 NOTE — TELEPHONE ENCOUNTER
[facility] Call Center    Phone Message    May a detailed message be left on voicemail: yes     Reason for Call: Other: Patient and uncle Tha calling that they emailed us 3 times with photos and want to make sure its sufficient for apt on Monday.    Please call patient back at [phone]. Thank you.    Action Taken: Message routed to:  [facility] Eye    Travel Screening: Not Applicable

## 2024-11-18 ENCOUNTER — VIRTUAL VISIT (OUTPATIENT)
Dept: OPHTHALMOLOGY | Facility: CLINIC | Age: 64
End: 2024-11-18
Payer: MEDICAID

## 2024-11-18 ENCOUNTER — TELEPHONE (OUTPATIENT)
Dept: OPHTHALMOLOGY | Facility: CLINIC | Age: 64
End: 2024-11-18

## 2024-11-18 DIAGNOSIS — Z98.890 POSTOPERATIVE EYE STATE: Primary | ICD-10-CM

## 2024-11-18 NOTE — PROGRESS NOTES
Cuco Travis is a 64 year old male who is being evaluated via a billable telephone visit.      Chief Complaint:   Post-op telephone visit    Subjective:   Eyes are more comfortable since surgery. Swelling and bruising have resolved. Using medications as prescribed. No vision changes.    Review of Systems   Constitutional, HEENT, cardiovascular, pulmonary, gi and gu systems are negative, except as otherwise noted.    Objective:  Vitals:  No vitals were obtained today due to virtual visit.     Physical Exam:   healthy, alert and no distress  PSYCH: Alert and oriented times 3; coherent speech, normal   rate and volume, able to articulate logical thoughts, able   to abstract reason, no tangential thoughts, no hallucinations   or delusions  Her affect is normal  RESP: No cough, no audible wheezing, able to talk in full sentences  Remainder of exam unable to be completed due to telephone visits     Patient photo reviewed, demonstrates incisions intact. Lower lid position improved.    Assessment & Plan  1. Postoperative eye state       Cuco Travis is 2 weeks status post Retraction repair with tarsoconjunctival flap, bilateral lower eyelid   The incision(s) are healing well.  The lid(s) are in excellent position.    I have recommended:  * Continue antibiotic ointment or bland lubricating ointment (eg vaseline or aquaphor) to the incision site BID. Continue maxitrol drops until drops run out. Continue lubrication and POAG drops as previously recommended.  * Massage along the incision BID.  * Warm soaks QID until all edema and ecchymoses resolve  Follow up with Dr. Wong as scheduled     Disposition: follow up telephone visit in 6-8 weeks, per patient preference given distance from clinic    5 minutes spent on the phone.  5 minutes spent by me on the date of the encounter doing patient visit      Attending Physician Attestation:  I did not speak with the patient, but I reviewed the case with the resident or fellow and  edited the care plan as necessary.   -Damian Recio MD

## 2024-11-18 NOTE — TELEPHONE ENCOUNTER
Spoke with patient regarding scheduling a TELEPHONE VISIT-Return in about 2 months (around 1/18/2025) for Follow Up.Telephone visit. Photos being sent plastics email - DOS 11/6/24. Scheduled patient accordingly and sent AVS Printout to confirmed address.-Per Patient

## 2024-12-09 ENCOUNTER — OFFICE VISIT (OUTPATIENT)
Dept: OPHTHALMOLOGY | Facility: CLINIC | Age: 64
End: 2024-12-09
Attending: OPHTHALMOLOGY
Payer: MEDICAID

## 2024-12-09 DIAGNOSIS — H16.213 EXPOSURE KERATOPATHY, BILATERAL: ICD-10-CM

## 2024-12-09 DIAGNOSIS — H17.9 CORNEAL SCAR AND OPACITY: ICD-10-CM

## 2024-12-09 DIAGNOSIS — H02.59 FLOPPY EYELID SYNDROME OF BOTH EYES: Primary | ICD-10-CM

## 2024-12-09 DIAGNOSIS — H04.123 CHRONICALLY DRY EYES, BILATERAL: ICD-10-CM

## 2024-12-09 PROCEDURE — 99024 POSTOP FOLLOW-UP VISIT: CPT | Mod: GC | Performed by: OPHTHALMOLOGY

## 2024-12-09 PROCEDURE — G0463 HOSPITAL OUTPT CLINIC VISIT: HCPCS | Performed by: OPHTHALMOLOGY

## 2024-12-09 ASSESSMENT — REFRACTION_WEARINGRX
OS_ADD: +3.00
OD_AXIS: 028
OS_CYLINDER: +1.50
OD_ADD: +3.00
SPECS_TYPE: PAL
OD_CYLINDER: +1.50
OS_SPHERE: -4.50
OS_AXIS: 105
OD_SPHERE: -0.75

## 2024-12-09 ASSESSMENT — TONOMETRY
IOP_METHOD: ICARE
OD_IOP_MMHG: 22
OS_IOP_MMHG: 27

## 2024-12-09 ASSESSMENT — VISUAL ACUITY
OD_CC: 20/150
OD_PH_CC: 20/80
METHOD: SNELLEN - LINEAR
OS_CC: 20/200

## 2024-12-09 ASSESSMENT — CONF VISUAL FIELD: METHOD: COUNTING FINGERS

## 2024-12-09 NOTE — NURSING NOTE
Chief Complaints and History of Present Illnesses   Patient presents with    Follow Up     Chief Complaint(s) and History of Present Illness(es)       Follow Up               Comments    Pt states no change in VA since last visit  States eyes are not quite as dry as last   No AM crusting or eye pain     Angeles Travis COT 12:18 PM December 9, 2024

## 2024-12-09 NOTE — PROGRESS NOTES
"Chief complaint   Corneal scarring, referral    HPI    Cuco Travis 64 year old male     Referred by Dr. Elier Lew. Last seen on 8/6/24. Patient is s/p AMT right eye x2 with minimal improvement in ocular surface.     Interval hx 12/09/2024: Patient had eyelid surgery on 11/6/24.  Chief Complaint(s) and History of Present Illness(es)       Follow Up               Comments    Pt states no change in VA since last visit  States eyes are not quite as dry as last   No AM crusting or eye pain     Angeles Travis COT 12:18 PM December 9, 2024                        Past ocular history   Chronic dry eye syndrome both eyes   Floppy eyelid syndrome both eyes   Primary open angle glaucoma (POAG) both eyes   Pseudophakia  Medicamentosa both eyes     History of \"cornea infection\" in 2008 that resulted from corneal abrasions. Required temporary tarso BOTH EYES, antibiotics, and steroid eyedrops    Prior eye surgery/laser/Trauma:    S/p trabeculectomy right eye (2009), Dr. Pisano  S/p trabeculectomy left eye with mitomycin (2010), Dr. Pisano  S/p CEIOL both eyes (2007) done at [facility] in [place], [place]    CTL wearer:No  Glasses : yes - bifocals  Family Hx of eye disease:    Father with glaucoma, macular degeneration, and detached retina   Maternal aunt with retinal detachment       PMH     Past Medical History:   Diagnosis Date    Diabetes (H)     Disorder of thyroid     HTN (hypertension)        PSH     Past Surgical History:   Procedure Laterality Date    ANKLE SURGERY  1996    crushed with forklift    CATARACT IOL, RT/LT      HERNIA REPAIR  2004    REPAIR ECTROPION BILATERAL Bilateral 11/6/2024    Procedure: REPAIR, ECTROPION, EYE, BILATERAL LOWER EYELIDS WITH TARSOCONJUNCTIVAL FLAP;  Surgeon: Damian Recio MD;  Location: [facility] OR    TRABECULECTOMY         Meds     Current Outpatient Medications   Medication Sig Dispense Refill    amLODIPine-atorvastatin (CADUET) 2.5-40 MG tablet Take 1 tablet by mouth daily. Does not know " dose      dulaglutide (TRULICITY) 0.75 MG/0.5ML pen Inject 0.75 mg subcutaneously every 7 days. 3.0mg qweekly      Empagliflozin (JARDIANCE PO) Take by mouth.      erythromycin (ROMYCIN) 5 MG/GM ophthalmic ointment Apply antibiotic ointment to all sutures three times a day, and 1/2 inch strip into the operated eye(s) at night. Use until follow up. 3.5 g 1    levothyroxine (SYNTHROID/LEVOTHROID) 25 MCG tablet Take 25 mcg by mouth daily. Pt unsure of doseage      lisinopril-hydrochlorothiazide (ZESTORETIC) 20-12.5 MG tablet Take 1 tablet by mouth daily.      metFORMIN (GLUCOPHAGE) 1000 MG tablet Take 1,000 mg by mouth 2 times daily (with meals).      metoprolol tartrate (LOPRESSOR) 50 MG tablet Take 50 mg by mouth 2 times daily. 100mg am, 50 mg qhs      neomycin-polymixin-dexAMETHasone (MAXITROL) 0.1 % ophthalmic suspension Please instill one drop into the operative eye(s) four times daily until the bottle is finished. 5 mL 0     No current facility-administered medications for this visit.       Labs   None    Drops Currently Taking   Genteal or Soothe gel at night.   Restasis BID each eye  Refresh Plus and Systane 3-5x/day PRN each eye  PF Timolol-Dorzolamide BID each eye  PF Lyuzeh at bedtime each eye  PF Latanoprost, each eye QHS  Assessment/Plan 12/09/2024   # Corneal neovascularization  Likely secondary to severe dry eye and medicamentosa from glaucoma drops (recently PF drops mid August 2024).   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    Plan:   -Continue Restasis twice a day both eyes   -Continue PFATs but increase to every two hours both eyes   -Continue genteal gel at night both eyes   -Discussed possible scleral lens OU - patient hesitant to try because of maintenance involved - recommend evaluation with scleral lens  -now s/p lag ophthalmos and floppy eyelids OU repair by oculoplastics 11/24  -will benefit from future SK with possible AMT vs DMAK for corneal  scarring after lid resolved - would defer for now given improvement in corneal clarity after optimization of the ocular surface after lid surgery    # Primary open angle glaucoma (POAG), both eyes   S/p trabeculectomy right eye (2009), Dr. Pisano  S/p trabeculectomy left eye with mitomycin (2010), Dr. Pisano  -Continue PF dorzolamide timolol twice a day both eyes   -Continue Lyuzeh at bedtime both eyes   -Continue to follow at [facility]   IOP not controlled 12/9/2024    Follow up:  Needs to see the glaucoma team to control IOP  Cornea 3 months    Sol Yee MD  Cornea and External Disease Fellow  [facility]    Attending Physician Attestation:  Complete documentation of historical and exam elements from today's encounter can be found in the full encounter summary report (not reduplicated in this progress note).  I personally obtained the chief complaint(s) and history of present illness.  I confirmed and edited as necessary the review of systems, past medical/surgical history, family history, social history, and examination findings as documented by others; and I examined the patient myself.  I personally reviewed the relevant tests, images, and reports as documented above.  I formulated and edited as necessary the assessment and plan and discussed the findings and management plan with the patient and family. - Derik Wong MD

## 2024-12-12 ENCOUNTER — OFFICE VISIT (OUTPATIENT)
Dept: OPHTHALMOLOGY | Facility: CLINIC | Age: 64
End: 2024-12-12
Payer: MEDICAID

## 2024-12-12 DIAGNOSIS — H16.213 EXPOSURE KERATOPATHY, BILATERAL: ICD-10-CM

## 2024-12-12 DIAGNOSIS — H02.132 SENILE ECTROPION OF BOTH LOWER EYELIDS: ICD-10-CM

## 2024-12-12 DIAGNOSIS — H02.135 SENILE ECTROPION OF BOTH LOWER EYELIDS: ICD-10-CM

## 2024-12-12 DIAGNOSIS — H04.129 DRY EYE: Primary | ICD-10-CM

## 2024-12-12 DIAGNOSIS — H52.213 IRREGULAR ASTIGMATISM OF BOTH EYES: ICD-10-CM

## 2024-12-12 DIAGNOSIS — H17.9 CORNEAL SCAR AND OPACITY: ICD-10-CM

## 2024-12-12 ASSESSMENT — REFRACTION_WEARINGRX
OD_AXIS: 028
OS_AXIS: 105
SPECS_TYPE: PAL
OD_CYLINDER: +1.50
OS_ADD: +3.00
OS_CYLINDER: +1.50
OD_ADD: +3.00
OD_SPHERE: -0.75
OS_SPHERE: -4.50

## 2024-12-12 ASSESSMENT — VISUAL ACUITY
CORRECTION_TYPE: GLASSES
OS_CC: 20/250
OD_PH_CC: 20/100
OD_CC: 20/200
METHOD: SNELLEN - LINEAR

## 2024-12-12 ASSESSMENT — TONOMETRY
OD_IOP_MMHG: 14
OS_IOP_MMHG: 20
OD_IOP_MMHG: 13
OS_IOP_MMHG: 19
IOP_METHOD: ICARE
IOP_METHOD: ICARE

## 2024-12-12 ASSESSMENT — REFRACTION_CURRENTRX
OD_DIAMETER: 14.8
OS_SPHERE: -2.00
OS_BASECURVE: 4.2/7.34
OD_BRAND: ZENLENS RC
OD_SPHERE: -2.00
OD_BASECURVE: 4.1/7.5
OS_DIAMETER: 14.8
OS_BRAND: ZENLENS RC

## 2024-12-12 NOTE — PATIENT INSTRUCTIONS
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

## 2024-12-12 NOTE — PROGRESS NOTES
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 Referred by Dr. Wnog for scleral lens eval  -Good initial response to scleral lens today. BCVA 20/25 right eye, 20/100 range left eye (may be limited by intraocular pathology)  -Reviewed findings with pt and family including: daily I&R, readers over, fitting process etc. They would like to check insurance coverage prior to proceeding    Discussed logistics of fitting, as he is from [place]. He has a local eyecare practice which he would like to do insertion and removal training at if they are willing. Will forward records. If I&R local will need follow-up here 1-2 months after for fit/K recheck. If local I&R not an option we can do dispense and then follow-up later that week if needed. HOLD on ordering until insurance checked - they will let me know if electing to proceed.     Seeing Dr. Duque today for glaucoma    I have confirmed the patient's CC, HPI and reviewed Past Medical History, Past Surgical History, Social History, Family History, Problem List, Medication List and agree with Tech note.     Monica Anaya OD FAAO [facility]    Copy:   [address] [place] [address]  P: [phone]  F: [phone]  Dr. Bobby Lew

## 2024-12-12 NOTE — LETTER
12/12/2024       RE: Cuco Travis  [address] [place] [address]     Dear Colleague,    I had the pleasure of seeing your patient Cuco Travis, at the [facility]. Please see a copy of my visit note below.    Mr. Travis is considering proceeding with scleral lens fitting. However, he would like to see if your practice is comfortable doing insertion and removal training for scleral contact lenses so he does not need to travel back here again. If so, we can mail lenses to him/your practice to do the training. I would like to follow-up with him to recheck the fit after he has been wearing them for several weeks.    If you are willing I am happy to do shared care. If you are not comfortable doing scleral lens insertion and removal training please let us know so we can coordinate this at our practice.    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 Referred by Dr. Wong for scleral lens eval  -Good initial response to scleral lens today. BCVA 20/25 right eye, 20/100 range left eye (may be limited by intraocular pathology)  -Reviewed findings with pt and family including: daily I&R, readers over, fitting process etc. They would like to check insurance coverage prior to proceeding    Discussed logistics of fitting, as he is from [place]. He has a local eyecare practice which he would like to do insertion and removal training at if they are willing. Will forward records. If I&R local will need follow-up here 1-2 months after for fit/K recheck. If local I&R not an option we can do dispense and then follow-up later that week if needed. HOLD on ordering until insurance checked - they will let me know if electing to proceed.     Seeing Dr. Duque today for glaucoma    I have confirmed the patient's CC, HPI and reviewed Past Medical History, Past Surgical  History, Social History, Family History, Problem List, Medication List and agree with Tech note.     LUIS FELIPE Harris    Copy:   [address] [place] [address]  P: [phone]  F: [phone]  Dr. Bobby Lew    Thank you for sharing in the care of this patient. Please do not hesitate to contact me with any questions or concerns.    Sincerely,        LUIS FELIPE Harris  Adjunct , Contact Lens Service  Dept of Ophthalmology & Visual Neuroscience  [facility]  P: [phone]  F: [phone]

## 2024-12-12 NOTE — NURSING NOTE
Chief Complaints and History of Present Illnesses   Patient presents with    Consult For     Chief Complaint(s) and History of Present Illness(es)       Consult For              Laterality: both eyes    Onset: 1 month ago    Severity: moderate    Course: gradually worsening              Comments    Patient here for scleral contact lens evaluation for dry eyes at the request of Dr. Wong.  Has never worn contacts in the past.      Gali Lindsay on 12/12/2024 at 12:21 PM

## 2024-12-13 NOTE — PROGRESS NOTES
Chief Complaint/Presenting Concern: Glaucoma Evaluation    History of Present Illness:   Cuco Travis is a 64 year old patient who presents for a glaucoma evaluation. He previously had cataract surgery of both eyes in in 2007 in [place] then he was managed by Dr. Pisano who performed trabeculectomy right eye in 2009 and the left eye in 2010. He then was followed by Dr. Lew in [facility].    Last seen by Dr. 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    Was seen by Dr. Duque 12/22/2024 who added diamox 500 mg BID for IOP 31/32.     Relevant Past Medical/Family/Social History:  Thyroid Disease, HTN, Type II DM    Relevant Review of Systems: Non Applicable.      Diagnosis: Primary open angle glaucoma severe stage each eye   Previous glaucoma surgery/laser  S/p trabeculectomy right eye (2009), Dr. Pisano  S/p trabeculectomy left eye with mitomycin (2010), Dr. Pisano  S/p CEIOL both eyes (2007) done at [facility] in [place], [place]   Maximum intraocular pressure 31/32  Currently Meds: PF Timolol-Dorzolamide BID each eye, PF Iyuzeh at bedtime each eye, diamox 500 mg   Family history: positive, Father  /546  Gonio open to scleral spur each eye   Trauma history: negative  Steroid exposure: positive topical prednisolone   Vasospastic disease: Migrane/Raynaud phenomenon: negative  A past hemodynamic crisis or Low BP:: negative  Meds AEs/intolerance: None  PMHx: DMII  Asthma and respiratory problems: None  Cardiac: None  Renal: None  Kidney stones/Sulfa Allergy: None  Anticoagulants: None    Prior testing   Low Vision Visual field 12/12/2024:   Right eye - Dense Inferior Arcuate and Superior Arcuate Defect, [place] of vision inferior, adequate reliability, baseline testing  Left eye - Dense Inferior Arcuate, Superior Paracentral field of vision, adequate reliability,  baseline testing   OCT Optic Nerve RNFL Spectralis 12/12/2024  Right eye:  Diffuse Severe RNFL Thinning. Poor Tracing, Baseline Testing.   Left eye: Diffuse Severe RNFL Thinning. Poor Tracing, Baseline Testing.     Additional Ocular History:   # Corneal neovascularization   -secondary to severe dry eye and medicamentosa from glaucoma drops (recently PF drops mid August 2024).   - follows with cornea     # Floppy eyelid syndrome of both eyes     #Senile ectropion of both lower eyelids   -s/p Bilateral lower lids LATERAL TARSAl STRIP + PATRICIA FLAP 11/06/2024 Dr. Recio.    #Ptosis of left eyelid     Plan/Recommendations:  Discussed findings with patient.  Severe glaucoma each eye uncontrolled on drops and Diamox. Given medicamentosa will aim to take patient off glaucoma drops.   Continue PF Timolol-Dorzolamide BID each eye  Continue PF Iyuzeh at bedtime each eye  Continue diamox 500 mg BID   Recommend Baerveldt tube shunt surgery.  Risks and benefits of Baerveldt tube shunt surgery in both eyes, including risks of bleeding, infection, need for additional surgery, failure of surgery, and vision loss were explained to patient, who expressed understanding and wishes to proceed with surgery.    Schedule BGI each eye start with left eye, block     Physician Attestation     Attending Physician Attestation:  Complete documentation of historical and exam elements from today's encounter can be found in the full encounter summary report (not reduplicated in this progress note). I personally obtained the chief complaint(s) and history of present illness. I confirmed and edited as necessary the review of systems, past medical/surgical history, family history, social history, and examination findings as documented by others; and I examined the patient myself. I personally reviewed the relevant tests, images, and reports as documented above. I formulated and edited as necessary the assessment and plan and discussed the findings and  management plan with the patient and any family members present at the time of the visit.  Jacquelyn Díaz M.D., Glaucoma, December 17, 2024

## 2024-12-16 DIAGNOSIS — H40.1133 PRIMARY OPEN ANGLE GLAUCOMA (POAG) OF BOTH EYES, SEVERE STAGE: Primary | ICD-10-CM

## 2024-12-17 ENCOUNTER — PREP FOR PROCEDURE (OUTPATIENT)
Dept: OPHTHALMOLOGY | Facility: CLINIC | Age: 64
End: 2024-12-17
Payer: MEDICAID

## 2024-12-17 ENCOUNTER — TELEPHONE (OUTPATIENT)
Dept: OPHTHALMOLOGY | Facility: CLINIC | Age: 64
End: 2024-12-17

## 2024-12-17 ENCOUNTER — OFFICE VISIT (OUTPATIENT)
Dept: OPHTHALMOLOGY | Facility: CLINIC | Age: 64
End: 2024-12-17
Attending: OPHTHALMOLOGY
Payer: MEDICAID

## 2024-12-17 DIAGNOSIS — H40.1133 PRIMARY OPEN ANGLE GLAUCOMA (POAG) OF BOTH EYES, SEVERE STAGE: ICD-10-CM

## 2024-12-17 DIAGNOSIS — H40.1133 PRIMARY OPEN ANGLE GLAUCOMA (POAG) OF BOTH EYES, SEVERE STAGE: Primary | ICD-10-CM

## 2024-12-17 PROCEDURE — 76514 ECHO EXAM OF EYE THICKNESS: CPT | Performed by: OPHTHALMOLOGY

## 2024-12-17 PROCEDURE — G0463 HOSPITAL OUTPT CLINIC VISIT: HCPCS | Performed by: OPHTHALMOLOGY

## 2024-12-17 PROCEDURE — 92020 GONIOSCOPY: CPT | Performed by: OPHTHALMOLOGY

## 2024-12-17 RX ORDER — ACETAZOLAMIDE 500 MG/1
500 CAPSULE, EXTENDED RELEASE ORAL 2 TIMES DAILY
Qty: 30 CAPSULE | Refills: 1 | Status: SHIPPED | OUTPATIENT
Start: 2024-12-17

## 2024-12-17 ASSESSMENT — TONOMETRY
OD_IOP_MMHG: 19
IOP_METHOD: TONOPEN
OS_IOP_MMHG: 17

## 2024-12-17 ASSESSMENT — EXTERNAL EXAM - RIGHT EYE: OD_EXAM: NORMAL

## 2024-12-17 ASSESSMENT — REFRACTION_WEARINGRX
OD_AXIS: 028
OS_SPHERE: -4.50
OS_AXIS: 105
OD_CYLINDER: +1.50
OD_SPHERE: -0.75
OS_CYLINDER: +1.50
OS_ADD: +3.00
SPECS_TYPE: PAL
OD_ADD: +3.00

## 2024-12-17 ASSESSMENT — CUP TO DISC RATIO
OD_RATIO: 0.85
OS_RATIO: 0.85

## 2024-12-17 ASSESSMENT — EXTERNAL EXAM - LEFT EYE: OS_EXAM: NORMAL

## 2024-12-17 ASSESSMENT — PACHYMETRY
OS_CT(UM): 546
OD_CT(UM): 573

## 2024-12-17 ASSESSMENT — VISUAL ACUITY
OS_PH_CC: 20/100
OD_PH_CC: 20/80
OD_CC: 20/200
CORRECTION_TYPE: GLASSES
OS_CC: 20/200
METHOD: SNELLEN - LINEAR

## 2024-12-17 NOTE — NURSING NOTE
Chief Complaints and History of Present Illnesses   Patient presents with    Glaucoma     Chief Complaint(s) and History of Present Illness(es)       Glaucoma              Laterality: both eyes              Comments    Pt. States that there has been no change in VA BE. No pain BE. No flashes or floaters BE. Does have family history of glaucoma in father.   Sarah Nilson COT 9:19 AM December 17, 2024

## 2024-12-17 NOTE — TELEPHONE ENCOUNTER
Per Dr. Díaz they are trying to get his added prior to the end of the year. Writer did offered 12/23, however pt had taken their Trulicity on 12/16 so there fore they are still inside the 7 days.   Writer then mentioned 12/30, however they advised pt and sister that they would need OR approval and that they would follow up once they heard back. Writer also suggested that pt get an H&P completed. Kezia Rider on 12/17/2024 at 12:33 PM

## 2024-12-18 PROBLEM — H40.1133 PRIMARY OPEN ANGLE GLAUCOMA (POAG) OF BOTH EYES, SEVERE STAGE: Status: ACTIVE | Noted: 2024-12-17

## 2024-12-18 NOTE — TELEPHONE ENCOUNTER
Called patient to schedule surgery with Dr Díaz    Spoke with: Shadia    Date(s) of Surgery: 12/30/24    Patient aware of approximate arrival time: Yes at 0545     Location of surgery: [facility]     Pre-Op H&P: [facility] at [facility]     Informed patient that they need to call to schedule pre-op H&P within 30 days of surgery date: Yes      Post-Op Appt Dates: 12/31 at 0930, 1/7 at 0900 w/Dr. Duque, POM1- TBD       Discussed with patient pre-op RN will call 2-3 days prior to surgery with arrival time and instructions:  Yes       Standard Surgery Packet Sent: Yes 12/18/24  via Mail - Standard      Additional Information Sent in Packet: Post-op Appointment Itinerary      Informed patient that they will need an adult  to bring patient home from surgery: Yes  : Shadia         Additional Comments:  NA      All patients questions were answered and was instructed to review surgical packet and call back [phone] with any questions or concerns.       Kezia Rider on 12/18/2024 at 12:21 PM

## 2024-12-19 ENCOUNTER — TELEPHONE (OUTPATIENT)
Dept: OPTOMETRY | Facility: CLINIC | Age: 64
End: 2024-12-19

## 2024-12-19 NOTE — TELEPHONE ENCOUNTER
[facility] Call Center    Phone Message    May a detailed message be left on voicemail: yes     Reason for Call: Other: Shadia states that the patient is scheduled for surgery and advised to hold off on the scleral lens for 2-3 months.  They will call back when he is healed to see if he will need to be re-fitted.  Thank you.       Action Taken: Message routed to:  [facility] Ophthalmoloigy    Travel Screening: Not Applicable     Date of Service:

## 2024-12-19 NOTE — TELEPHONE ENCOUNTER
HOLD on any lens ordering pending glaucoma surgery.    He is getting tubes in both eyes so would need Eyeprint only if proceeding with sclerals. In this case would need full care here going forward, not shared care.    Will hold on anything pending follow-up with me

## 2024-12-23 ENCOUNTER — TELEPHONE (OUTPATIENT)
Dept: OPHTHALMOLOGY | Facility: CLINIC | Age: 64
End: 2024-12-23
Payer: MEDICAID

## 2024-12-23 DIAGNOSIS — H40.1133 PRIMARY OPEN ANGLE GLAUCOMA (POAG) OF BOTH EYES, SEVERE STAGE: Primary | ICD-10-CM

## 2024-12-23 RX ORDER — PREDNISOLONE ACETATE 10 MG/ML
1 SUSPENSION/ DROPS OPHTHALMIC
Qty: 5 ML | Refills: 0 | Status: SHIPPED | OUTPATIENT
Start: 2024-12-30

## 2024-12-23 RX ORDER — MOXIFLOXACIN 5 MG/ML
1 SOLUTION/ DROPS OPHTHALMIC 4 TIMES DAILY
Qty: 3 ML | Refills: 0 | Status: SHIPPED | OUTPATIENT
Start: 2024-12-23

## 2024-12-23 NOTE — TELEPHONE ENCOUNTER
Called and spoke to Shadia     Provided the CPT code for glaucoma     Cuco spoke to his insurance -his insurance stated he has to call the clinic -    Madeleine Davila Communication Facilitator on 12/23/2024 at 4:07 PM

## 2024-12-23 NOTE — TELEPHONE ENCOUNTER
[facility] Call Center    Phone Message    May a detailed message be left on voicemail: yes     Reason for Call: Other: Shadia called requesting a call back. She states the care team was looking into his insurance to make sure his upcoming surgery is covered. Please call Shadia [phone] to advise.      Action Taken: Other: eye    Travel Screening: Not Applicable

## 2024-12-26 ENCOUNTER — ANESTHESIA EVENT (OUTPATIENT)
Dept: SURGERY | Facility: AMBULATORY SURGERY CENTER | Age: 64
End: 2024-12-26
Payer: MEDICAID

## 2024-12-30 ENCOUNTER — HOSPITAL ENCOUNTER (OUTPATIENT)
Facility: AMBULATORY SURGERY CENTER | Age: 64
Discharge: HOME | End: 2024-12-30
Attending: OPHTHALMOLOGY
Payer: MEDICAID

## 2024-12-30 ENCOUNTER — ANESTHESIA (OUTPATIENT)
Dept: SURGERY | Facility: AMBULATORY SURGERY CENTER | Age: 64
End: 2024-12-30
Payer: MEDICAID

## 2024-12-30 VITALS
TEMPERATURE: 97 F | RESPIRATION RATE: 16 BRPM | DIASTOLIC BLOOD PRESSURE: 72 MMHG | WEIGHT: 277.78 LBS | BODY MASS INDEX: 43.6 KG/M2 | HEART RATE: 81 BPM | OXYGEN SATURATION: 95 % | HEIGHT: 67 IN | SYSTOLIC BLOOD PRESSURE: 116 MMHG

## 2024-12-30 DIAGNOSIS — H40.1133 PRIMARY OPEN ANGLE GLAUCOMA (POAG) OF BOTH EYES, SEVERE STAGE: ICD-10-CM

## 2024-12-30 LAB — GLUCOSE BLDC GLUCOMTR-MCNC: 162 MG/DL (ref 70–99)

## 2024-12-30 PROCEDURE — 82962 GLUCOSE BLOOD TEST: CPT | Performed by: PATHOLOGY

## 2024-12-30 DEVICE — EYE IMP OPTIGRAFT CORNEA 1/2 HALO HCO-HH1: Type: IMPLANTABLE DEVICE | Site: EYE | Status: FUNCTIONAL

## 2024-12-30 DEVICE — EYE IMP VALVE BAERVELDT 350 BG101-350: Type: IMPLANTABLE DEVICE | Site: EYE | Status: FUNCTIONAL

## 2024-12-30 RX ORDER — NALOXONE HYDROCHLORIDE 0.4 MG/ML
0.1 INJECTION, SOLUTION INTRAMUSCULAR; INTRAVENOUS; SUBCUTANEOUS
Status: DISCONTINUED | OUTPATIENT
Start: 2024-12-30 | End: 2024-12-31 | Stop reason: HOSPADM

## 2024-12-30 RX ORDER — BALANCED SALT SOLUTION 6.4; .75; .48; .3; 3.9; 1.7 MG/ML; MG/ML; MG/ML; MG/ML; MG/ML; MG/ML
SOLUTION OPHTHALMIC PRN
Status: DISCONTINUED | OUTPATIENT
Start: 2024-12-30 | End: 2024-12-30 | Stop reason: HOSPADM

## 2024-12-30 RX ORDER — KETAMINE HYDROCHLORIDE 10 MG/ML
INJECTION INTRAMUSCULAR; INTRAVENOUS PRN
Status: DISCONTINUED | OUTPATIENT
Start: 2024-12-30 | End: 2024-12-30

## 2024-12-30 RX ORDER — ONDANSETRON 4 MG/1
4 TABLET, ORALLY DISINTEGRATING ORAL EVERY 30 MIN PRN
Status: DISCONTINUED | OUTPATIENT
Start: 2024-12-30 | End: 2024-12-31 | Stop reason: HOSPADM

## 2024-12-30 RX ORDER — ONDANSETRON 2 MG/ML
4 INJECTION INTRAMUSCULAR; INTRAVENOUS EVERY 30 MIN PRN
Status: DISCONTINUED | OUTPATIENT
Start: 2024-12-30 | End: 2024-12-31 | Stop reason: HOSPADM

## 2024-12-30 RX ORDER — SODIUM CHLORIDE 9 MG/ML
INJECTION, SOLUTION INTRAVENOUS CONTINUOUS PRN
Status: DISCONTINUED | OUTPATIENT
Start: 2024-12-30 | End: 2024-12-30

## 2024-12-30 RX ORDER — LIDOCAINE 40 MG/G
CREAM TOPICAL
Status: DISCONTINUED | OUTPATIENT
Start: 2024-12-30 | End: 2024-12-30 | Stop reason: HOSPADM

## 2024-12-30 RX ORDER — DEXAMETHASONE SODIUM PHOSPHATE 10 MG/ML
4 INJECTION, SOLUTION INTRAMUSCULAR; INTRAVENOUS
Status: DISCONTINUED | OUTPATIENT
Start: 2024-12-30 | End: 2024-12-31 | Stop reason: HOSPADM

## 2024-12-30 RX ORDER — DEXAMETHASONE SODIUM PHOSPHATE 4 MG/ML
INJECTION, SOLUTION INTRA-ARTICULAR; INTRALESIONAL; INTRAMUSCULAR; INTRAVENOUS; SOFT TISSUE PRN
Status: DISCONTINUED | OUTPATIENT
Start: 2024-12-30 | End: 2024-12-30 | Stop reason: HOSPADM

## 2024-12-30 RX ORDER — OXYCODONE HYDROCHLORIDE 5 MG/1
5 TABLET ORAL
Status: DISCONTINUED | OUTPATIENT
Start: 2024-12-30 | End: 2024-12-31 | Stop reason: HOSPADM

## 2024-12-30 RX ORDER — LIDOCAINE HYDROCHLORIDE 20 MG/ML
INJECTION, SOLUTION INFILTRATION; PERINEURAL PRN
Status: DISCONTINUED | OUTPATIENT
Start: 2024-12-30 | End: 2024-12-30

## 2024-12-30 RX ORDER — PROPOFOL 10 MG/ML
INJECTION, EMULSION INTRAVENOUS PRN
Status: DISCONTINUED | OUTPATIENT
Start: 2024-12-30 | End: 2024-12-30

## 2024-12-30 RX ORDER — FENTANYL CITRATE 50 UG/ML
25 INJECTION, SOLUTION INTRAMUSCULAR; INTRAVENOUS
Status: DISCONTINUED | OUTPATIENT
Start: 2024-12-30 | End: 2024-12-31 | Stop reason: HOSPADM

## 2024-12-30 RX ORDER — TETRACAINE HYDROCHLORIDE 5 MG/ML
SOLUTION OPHTHALMIC PRN
Status: DISCONTINUED | OUTPATIENT
Start: 2024-12-30 | End: 2024-12-30 | Stop reason: HOSPADM

## 2024-12-30 RX ORDER — OXYCODONE HYDROCHLORIDE 5 MG/1
10 TABLET ORAL
Status: DISCONTINUED | OUTPATIENT
Start: 2024-12-30 | End: 2024-12-31 | Stop reason: HOSPADM

## 2024-12-30 RX ORDER — ACETAMINOPHEN 325 MG/1
975 TABLET ORAL ONCE
Status: COMPLETED | OUTPATIENT
Start: 2024-12-30 | End: 2024-12-30

## 2024-12-30 RX ADMIN — SODIUM CHLORIDE: 9 INJECTION, SOLUTION INTRAVENOUS at 07:27

## 2024-12-30 RX ADMIN — KETAMINE HYDROCHLORIDE 10 MG: 10 INJECTION INTRAMUSCULAR; INTRAVENOUS at 08:27

## 2024-12-30 RX ADMIN — LIDOCAINE HYDROCHLORIDE 80 MG: 20 INJECTION, SOLUTION INFILTRATION; PERINEURAL at 07:35

## 2024-12-30 RX ADMIN — KETAMINE HYDROCHLORIDE 10 MG: 10 INJECTION INTRAMUSCULAR; INTRAVENOUS at 07:31

## 2024-12-30 RX ADMIN — PROPOFOL 40 MG: 10 INJECTION, EMULSION INTRAVENOUS at 07:35

## 2024-12-30 RX ADMIN — ACETAMINOPHEN 975 MG: 325 TABLET ORAL at 06:59

## 2024-12-30 ASSESSMENT — COPD QUESTIONNAIRES: COPD: 0

## 2024-12-30 ASSESSMENT — ENCOUNTER SYMPTOMS: ORTHOPNEA: 0

## 2024-12-30 NOTE — ANESTHESIA PREPROCEDURE EVALUATION
Anesthesia Pre-Procedure Evaluation    Patient: Cuco Travis   MRN: [id number] : 1960        Procedure : Procedure(s):  LEFT EYE INSERTION, BAERVEDLT TUBE SHUNT IMPLANT          Past Medical History:   Diagnosis Date    Diabetes (H)     Disorder of thyroid     HTN (hypertension)       Past Surgical History:   Procedure Laterality Date    ANKLE SURGERY      crushed with forklift    CATARACT IOL, RT/LT      HERNIA REPAIR  2004    REPAIR ECTROPION BILATERAL Bilateral 2024    Procedure: REPAIR, ECTROPION, EYE, BILATERAL LOWER EYELIDS WITH TARSOCONJUNCTIVAL FLAP;  Surgeon: Damian Recio MD;  Location: [facility] OR    TRABECULECTOMY        Allergies   Allergen Reactions    Egg Yolk      Pt is allergic to eggs, however he can them in cakes.       Social History     Tobacco Use    Smoking status: Never    Smokeless tobacco: Never   Substance Use Topics    Alcohol use: Yes      Wt Readings from Last 1 Encounters:   24 126 kg (277 lb 12.5 oz)        Anesthesia Evaluation   Pt has had prior anesthetic. Type: General.    No history of anesthetic complications       ROS/MED HX  ENT/Pulmonary:     (+) sleep apnea, uses CPAP,                                   (-) COPD and recent URI   Neurologic: Comment: Corneal scarring/poor vision      Cardiovascular:     (+)  hypertension-range: 100/60/ -   -  - -                                   (-) HAWLEY, orthopnea/PND and syncope   METS/Exercise Tolerance: 4 - Raking leaves, gardening Comment: Activity limited because of his poor vision. Goes up and down a flight of stairs for laundry without dyspnea, chest pain/pressure   Hematologic:       Musculoskeletal:   (+)  arthritis,             GI/Hepatic: Comment: Denies heartburn/reflux   (-) GERD   Renal/Genitourinary:       Endo:     (+)  type II DM (Last Trulicity injection was 24), Last HgA1c: 7.8, date: , Not using insulin, - not using insulin pump.  not previously admitted for DM/DKA.  thyroid problem  "(TSH wnl 12/24), hypothyroidism,    Obesity,       Psychiatric/Substance Use:       Infectious Disease:       Malignancy:       Other:            Physical Exam    Airway        Mallampati: III   TM distance: > 3 FB   Neck ROM: full   Mouth opening: > 3 cm    Respiratory Devices and Support         Dental     Comment: Missing, broken, capped teeth      B=Bridge, C=Chipped, L=Loose, M=Missing    Cardiovascular          Rhythm and rate: regular and normal     Pulmonary           breath sounds clear to auscultation           OUTSIDE LABS:  CBC: No results found for: \"WBC\", \"HGB\", \"HCT\", \"PLT\"  BMP:   Lab Results   Component Value Date     (H) 11/06/2024     COAGS: No results found for: \"PTT\", \"INR\", \"FIBR\"  POC: No results found for: \"BGM\", \"HCG\", \"HCGS\"  HEPATIC: No results found for: \"ALBUMIN\", \"PROTTOTAL\", \"ALT\", \"AST\", \"GGT\", \"ALKPHOS\", \"BILITOTAL\", \"BILIDIRECT\", \"STARLA\"  OTHER: No results found for: \"PH\", \"LACT\", \"A1C\", \"ROSSY\", \"PHOS\", \"MAG\", \"LIPASE\", \"AMYLASE\", \"TSH\", \"T4\", \"T3\", \"CRP\", \"SED\"    Anesthesia Plan    ASA Status:  3    NPO Status:  NPO Appropriate    Anesthesia Type: MAC.     - Reason for MAC: straight local not clinically adequate              Consents    Anesthesia Plan(s) and associated risks, benefits, and realistic alternatives discussed. Questions answered and patient/representative(s) expressed understanding.     - Discussed:     - Discussed with:  Patient            Postoperative Care            Comments:    Other Comments: I discussed with patient, his sister at bedside, CRNA, and surgeon. He has RACHEL and I noted lower SpO2 levels during his MAC case last month. Patient is in agreement with proceeding under retrobulbar block and minimal sedation, knowing that he may feel some tugging/pulling sensation. If he were to need more anesthesia, we would likely need to convert to general anesthesia, and this may mean that he would need postoperative admission for respiratory monitoring. " "    Discussed plan for MAC, including possibility of awareness, risk of aspiration pneumonia, risk of hypoxia/low oxygen/airway obstruction requiring additional airway support/devices. Discussed alternatives. Discussed backup plan of general anesthesia and risks of general anesthesia, including sore throat/hoarse voice, abrasions/damage to lips/tongue/teeth, nausea, rare complications (including medication reactions, cardiac, pulmonary). Ensured understanding, invited questions and all questions were answered.           Anisa Rosas MD    I have reviewed the pertinent notes and labs in the chart from the past 30 days and (re)examined the patient.  Any updates or changes from those notes are reflected in this note.               # Hypertension: Home medication list includes antihypertensive(s)           # Severe Obesity: Estimated body mass index is 43.51 kg/m  as calculated from the following:    Height as of this encounter: 1.702 m (5' 7\").    Weight as of this encounter: 126 kg (277 lb 12.5 oz).             "

## 2024-12-30 NOTE — DISCHARGE INSTRUCTIONS
Discharge Instructions Following Surgery    Date: 12/30/2024    EYE MEDICATIONS:  We will discuss how to use your eyedrops at your postoperative visit tomorrow. Continue all drops in the non-operative eye as you were previously prescribed.     EYE PATCH: Leave the patch on, we will remove the patch in clinic tomorrow.     Stop oral diamox.     For 5 days: Wear your glasses or leave the eye uncovered while awake.    Wear the eye shield every night and during daytime naps.  DO NOT use padding under eye shield.    You may notice blurred or double vision initially, this will gradually clear.     If you experience any severe pain, sudden decrease in vision, or discharge for the eye, contact your doctor immediately.     Minor itching, scratching, burning etc. is normal. Use regular or extra-strength Tylenol for discomfort.    Refrain from heavy lifting, excessive bending over, and heavy exertion for 1 week.    You may bathe or shower but do not get the eye wet.    Do not rub or push on the operative eye for 1 week.  You may wipe the lids gently with a warm wet cloth to remove matter from eyelashes.    Continue with your usual diet and medications prescribed by your doctor.    Sunglasses will increase your comfort post-operatively.    Post Operative Visit on 12/31/24  Bring all material and medications to the office on the first post-op day.  Call your surgeon at [phone] or the answering service for any problems, especially pain. [facility]  Home Care Following Anesthesia  For 24 hours after surgery:  Get plenty of rest.  A responsible adult must stay with you for at least 24 hours after you leave the surgery center.  Do not drive or use heavy equipment.  If you have weakness or tingling, don't drive or use heavy equipment until this feeling goes away.   Do not drink alcohol.   Avoid strenuous or risky activities.  Ask for help when climbing stairs.  You may feel lightheaded.  IF  so, sit for a few minutes before standing.  Have someone help you get up.   If you have nausea (feel sick to your stomach): Drink only clear liquids such as apple juice, ginger ale, broth or 7-Up.  Rest may also help.  Be sure to drink enough fluids.  Move to a regular diet as you feel able.   You may have a slight fever.  Call the doctor if your fever is over 100 F (37.7 C) (taken under the tongue) or lasts longer than 24 hours.  You may have a dry mouth, a sore throat, muscle aches or trouble sleeping. These should go away after 24 hours.  Do not make important or legal decisions.   It is recommended to avoid smoking.               Tips for taking pain medications  To get the best pain relief possible, remember these points:  Take pain medications as directed, before pain becomes severe.  Pain medication can upset your stomach: taking it with food may help.  Constipation is a common side effect of pain medication. Drink plenty of  fluids.  Eat foods high in fiber. Take a stool softener if recommended by your doctor or pharmacist.  Do not drink alcohol, drive or operate machinery while taking pain medications.  Ask about other ways to control pain, such as with heat, ice or relaxation.    Tylenol/Acetaminophen Consumption    If you feel your pain relief is insufficient, you may take Tylenol/Acetaminophen in addition to your narcotic pain medication.   Be careful not to exceed 4,000 mg of Tylenol/Acetaminophen in a 24 hour period from all sources.  If you are taking extra strength Tylenol/acetaminophen (500 mg), the maximum dose is 8 tablets in 24 hours.  If you are taking regular strength acetaminophen (325 mg), the maximum dose is 12 tablets in 24 hours.    Call a doctor for any of the following:  Signs of infection (fever, growing tenderness at the surgery site, a large amount of drainage or bleeding, severe pain, foul-smelling drainage, redness, swelling).  It has been over 8 to 10 hours since surgery and you  are still not able to urinate (pass water).  Headache for over 24 hours.  Numbness, tingling or weakness the day after surgery (if you had spinal anesthesia).  Signs of Covid-19 infection (temperature over 100 degrees, shortness of breath, cough, loss of taste/smell, generalized body aches, persistent headache, chills, sore throat, nausea/vomiting/diarrhea)    Your doctor is:       Dr. Díaz, Ophthalmology: [phone]               After hours and weekends call the hospital @ [phone] and ask for the resident on call for:  Ophthalmology  For emergency care, call the:  [place] [facility]:  [phone] (TTY for hearing impaired: [phone])

## 2024-12-30 NOTE — ANESTHESIA CARE TRANSFER NOTE
Patient: Cuco Travis    Procedure: Procedure(s):  LEFT EYE INSERTION, BAERVEDLT TUBE SHUNT IMPLANT       Diagnosis: Primary open angle glaucoma (POAG) of both eyes, severe stage [H40.1133]  Diagnosis Additional Information: No value filed.    Anesthesia Type:   No value filed.     Note:    Oropharynx: oropharynx clear of all foreign objects and spontaneously breathing  Level of Consciousness: awake  Oxygen Supplementation: room air    Independent Airway: airway patency satisfactory and stable  Dentition: dentition unchanged  Vital Signs Stable: post-procedure vital signs reviewed and stable  Report to RN Given: handoff report given  Patient transferred to: Phase II    Handoff Report: Identifed the Patient, Identified the Reponsible Provider, Reviewed the pertinent medical history, Discussed the surgical course, Reviewed Intra-OP anesthesia mangement and issues during anesthesia, Set expectations for post-procedure period and Allowed opportunity for questions and acknowledgement of understanding      Vitals:  Vitals Value Taken Time   /74 12/30/24 0846   Temp     Pulse 80 12/30/24 0846   Resp 14    SpO2 95 % 12/30/24 0849   Vitals shown include unfiled device data.    Electronically Signed By: ION Brooks CRNA  December 30, 2024  8:49 AM

## 2024-12-30 NOTE — ANESTHESIA POSTPROCEDURE EVALUATION
Patient: Cuco Travis    Procedure: Procedure(s):  LEFT EYE INSERTION, BAERVEDLT TUBE SHUNT IMPLANT       Anesthesia Type:  MAC    Note:  Disposition: Outpatient   Postop Pain Control: Uneventful            Sign Out: Well controlled pain   PONV: No   Neuro/Psych: Uneventful            Sign Out: Acceptable/Baseline neuro status   Airway/Respiratory: Uneventful            Sign Out: Acceptable/Baseline resp. status   CV/Hemodynamics: Uneventful            Sign Out: Acceptable CV status; No obvious hypovolemia; No obvious fluid overload   Other NRE:    DID A NON-ROUTINE EVENT OCCUR?     Event details/Postop Comments:  I saw Cuco in recovery prior to discharge - he is fully alert with no evidence of sedation, and no opioids used. He had some intermittent, tolerable discomfort and was awake enough to remind himself to take deep breaths. We were able to maintain SpO2 in the 90s throughout. We discussed planning for his surgery on the other eye. He thinks that he can tolerate having the same experience again. He should also bring his Bipap machine in case he needs general anesthesia.           Last vitals:  Vitals Value Taken Time   /76 12/30/24 0915   Temp 36.1  C (97  F) 12/30/24 0900   Pulse 89 12/30/24 0915   Resp 16 12/30/24 0900   SpO2 95 % 12/30/24 0916   Vitals shown include unfiled device data.    Electronically Signed By: Anisa Rosas MD  December 30, 2024  9:33 AM

## 2024-12-30 NOTE — PROGRESS NOTES
Chief Complaint/Presenting Concern: Glaucoma Evaluation    History of Present Illness:   Cuco Travis is a 64 year old patient who presents for a glaucoma evaluation. He previously had cataract surgery of both eyes in in 2007 in [place] then he was managed by Dr. Pisano who performed trabeculectomy right eye in 2009 and the left eye in 2010. He then was followed by Dr. Lew in [facility].    Last seen by Dr. 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    Was seen by Dr. Duque 12/22/2024 who added diamox 500 mg BID for IOP 31/32.     12/31/24 POD1 s/p BGI left eye. Doing well.     Relevant Past Medical/Family/Social History:  Thyroid Disease, HTN, Type II DM    Relevant Review of Systems: Non Applicable.      Diagnosis: Primary open angle glaucoma severe stage each eye   Previous glaucoma surgery/laser  S/p trabeculectomy right eye (2009), Dr. Pisano  S/p trabeculectomy left eye with mitomycin (2010), Dr. Pisano  S/p CEIOL both eyes (2007) done at [facility] in [place], [place]   S/p BGI left eye 12/31/24 Dr. Díaz  Maximum intraocular pressure 31/32  Currently Meds: PF Timolol-Dorzolamide BID right eye, PF Iyuzeh at bedtime right eye   Family history: positive, Father  /546  Gonio open to scleral spur each eye   Trauma history: negative  Steroid exposure: positive topical prednisolone   Vasospastic disease: Migrane/Raynaud phenomenon: negative  A past hemodynamic crisis or Low BP:: negative  Meds AEs/intolerance: None  PMHx: DMII  Asthma and respiratory problems: None  Cardiac: None  Renal: None  Kidney stones/Sulfa Allergy: None  Anticoagulants: None    Prior testing   Low Vision Visual field 12/12/2024:   Right eye - Dense Inferior Arcuate and Superior Arcuate Defect, [place] of vision inferior, adequate reliability, baseline testing  Left eye - Dense  Inferior Arcuate, Superior Paracentral field of vision, adequate reliability, baseline testing   OCT Optic Nerve RNFL Spectralis 12/12/2024  Right eye:  Diffuse Severe RNFL Thinning. Poor Tracing, Baseline Testing.   Left eye: Diffuse Severe RNFL Thinning. Poor Tracing, Baseline Testing.     Additional Ocular History:   # Corneal neovascularization   -secondary to severe dry eye and medicamentosa from glaucoma drops (recently PF drops mid August 2024).   - follows with cornea     # Floppy eyelid syndrome of both eyes     #Senile ectropion of both lower eyelids   -s/p Bilateral lower lids LATERAL TARSAl STRIP + PATRICIA FLAP 11/06/2024 Dr. Recio.    #Ptosis of left eyelid     Plan/Recommendations:  Discussed findings with patient.  Severe glaucoma each eye uncontrolled on drops and Diamox. Given medicamentosa will aim to take patient off glaucoma drops.   POD1 s/p BGI left eye. Doing well.   Continue PF Timolol-Dorzolamide BID each eye  Continue PF Iyuzeh at bedtime each eye  Restart diamox 500 mg BID   Prednisolone 6x/day left eye   Moxifloxacin QID left eye   Postop precautions and instructions given to the patient     RTC POW1, planning BGI right eye    Martínez Gabriel MD  Resident Physician, PGY-3  Department of Ophthalmology     Physician Attestation     Attending Physician Attestation:  Complete documentation of historical and exam elements from today's encounter can be found in the full encounter summary report (not reduplicated in this progress note). I personally obtained the chief complaint(s) and history of present illness. I confirmed and edited as necessary the review of systems, past medical/surgical history, family history, social history, and examination findings as documented by others; and I examined the patient myself. I personally reviewed the relevant tests, images, and reports as documented above. I formulated and edited as necessary the assessment and plan and discussed the findings and  management plan with the patient and any family members present at the time of the visit.  Jacquelyn Díaz M.D., Glaucoma, December 31, 2024

## 2024-12-30 NOTE — OP NOTE
Cuco Travis  [id number]  12/30/2024    PREOPERATIVE DIAGNOSIS: Primary Open Angle Glaucoma Severe Stage left eye     POSTOPERATIVE DIAGNOSIS:Same as pre-operative diagnosis    OPERATION PERFORMED: Procedure(s):  LEFT EYE INSERTION, BAERVEDLT TUBE SHUNT IMPLANT    SURGEON:  Jacquelyn Díaz MD- Primary Surgeon  Martínez Gabriel MD- Assisting Resident     ANESTHESIA: MAC with block    COMPLICATIONS: none    FINDINGS:  Anatomy as expected    ESTIMATED BLOOD LOSS: minimal    SPECIMENS:  * No specimens in log *    IMPLANTS:  Implant Name Type Inv. Item Serial No.  Lot No. LRB No. Used Action   EYE IMP OPTIGRAFT CORNEA 1/2 HALO HCO-HH1 - [id number] Bone/Tissue/Biologic EYE IMP OPTIGRAFT CORNEA 1/2 HALO HCO-HH1 [id number] TISSUE HENRY INTERNA [id number] Left 1 Implanted   EYE IMP VALVE BAERVELDT 350 -312 - [id number] Lens/Eye Implant EYE IMP VALVE BAERVELDT 350 -441 [id number] [employer]  Left 1 Implanted      PROCEDURE:  Local anesthesia  Betadine paint and drop in holding room  Prep and drape in usual manner in OR  Time out according to protocol  Superior traction suture  Superotemporal fornix-based conjunctival flap.  Bleeding controlled with cautery  Adequate pocket created using blunt and sharp dissection.   Superior rectus and lateral rectusmuscles isolated.  Baerveldt implant inserted under the muscles  Baerveldt implant sutured onto the globe using 8-0 nylon sutures with the knot rotated into the well of the implant.  Tube ligated with 7-0 vicryl and tested for adequacy of ligature with balanced salt solution on 30 gauge cannula  Tube trimmed with bevel up  23 gauge needle track made into the anterior chamber   Tube threaded into the eye  Tube secured to globe with a 7-0 vicryl suture  Two fenestrations made with a spatula needle   Corneal patch graft placed over the anterior portion of the tube and secured with 7-0 vicryl  Conjunctiva closed with 8-0 vicryl interrupted  and running sutures  Subconjunctival injection of antibiotic and steroid  Removal of traction suture  Topical antibiotic/steroid ointment  Dry sterile dressing  The patient tolerated the procedure well. There were no unexpected findings or complications.

## 2024-12-30 NOTE — BRIEF OP NOTE
[facility] Brief Operative Note    Pre-operative diagnosis: Primary open angle glaucoma (POAG) of both eyes, severe stage [H40.1133]   Post-operative diagnosis Same as above    Procedure: Procedure(s):  LEFT EYE INSERTION, BAERVEDLT TUBE SHUNT IMPLANT   Surgeon(s): Surgeons and Role:     * Jacquelyn Díaz MD - Primary     * Martínez Gabriel MD - Resident - Assisting   Estimated blood loss: Minimal   Specimens: * No specimens in log *   Findings: As expected            Implant Name Type Inv. Item Serial No.  Lot No. LRB No. Used Action   EYE IMP OPTIGRAFT CORNEA 1/2 HALO HCO-HH1 - [id number] Bone/Tissue/Biologic EYE IMP OPTIGRAFT CORNEA 1/2 HALO HCO-HH1 [id number] TISSUE HENRY INTERNA [id number] Left 1 Implanted   EYE IMP VALVE BAERVELDT 350 -685 - [id number] Lens/Eye Implant EYE IMP VALVE BAERVELDT 350 -861 [id number] [employer]  Left 1 Implanted      Martínez Gabriel MD  Resident Physician, PGY-3  Department of Ophthalmology

## 2024-12-31 ENCOUNTER — OFFICE VISIT (OUTPATIENT)
Dept: OPHTHALMOLOGY | Facility: CLINIC | Age: 64
End: 2024-12-31
Attending: OPHTHALMOLOGY
Payer: MEDICAID

## 2024-12-31 DIAGNOSIS — Z98.890 POSTOPERATIVE EYE STATE: Primary | ICD-10-CM

## 2024-12-31 DIAGNOSIS — H40.1113 PRIMARY OPEN ANGLE GLAUCOMA (POAG) OF RIGHT EYE, SEVERE STAGE: Primary | ICD-10-CM

## 2024-12-31 PROCEDURE — G0463 HOSPITAL OUTPT CLINIC VISIT: HCPCS | Performed by: OPHTHALMOLOGY

## 2024-12-31 RX ORDER — ACETAZOLAMIDE 250 MG/1
500 TABLET ORAL 2 TIMES DAILY
Qty: 60 TABLET | Refills: 2 | Status: SHIPPED | OUTPATIENT
Start: 2024-12-31

## 2024-12-31 ASSESSMENT — VISUAL ACUITY
METHOD: SNELLEN - LINEAR
OD_PH_CC: 20/70
OD_CC: 20/100
OD_CC+: -1
OD_PH_CC+: -1

## 2024-12-31 ASSESSMENT — REFRACTION_WEARINGRX
OS_CYLINDER: +1.50
SPECS_TYPE: PAL
OS_SPHERE: -4.50
OD_ADD: +3.00
OD_CYLINDER: +1.50
OD_AXIS: 028
OS_ADD: +3.00
OD_SPHERE: -0.75
OS_AXIS: 105

## 2024-12-31 ASSESSMENT — TONOMETRY
IOP_METHOD: APPLANATION
OD_IOP_MMHG: 24
IOP_METHOD: TONOPEN
OS_IOP_MMHG: 22
OS_IOP_MMHG: 27
OD_IOP_MMHG: 24

## 2024-12-31 ASSESSMENT — EXTERNAL EXAM - LEFT EYE: OS_EXAM: NORMAL

## 2024-12-31 ASSESSMENT — EXTERNAL EXAM - RIGHT EYE: OD_EXAM: NORMAL

## 2024-12-31 NOTE — PATIENT INSTRUCTIONS
Take prednisolone (pink top) eye drop 4 times per day in the left eye     Take moxifloxacin (tan top) eye drop 4 times per day in the left eye     Continue PF Timolol-Dorzolamide 1 drop twice daily in each eye    Continue PF Iyuzeh at bedtime each eye    Restart Diamox 500 mg 1 tablet twice daily          For 5 days: Wear your glasses or leave the eye uncovered while awake.    Wear the eye shield every night and during daytime naps.  DO NOT use padding under eye shield.    You may notice blurred or double vision initially, this will gradually clear.     If you experience any severe pain, sudden decrease in vision, or discharge for the eye, contact your doctor immediately.     Minor itching, scratching, burning etc. is normal. Use regular or extra-strength Tylenol for discomfort.    Refrain from heavy lifting, excessive bending over, and heavy exertion for 1 week.    You may bathe or shower but do not get the eye wet.    Do not rub or push on the operative eye for 1 week.  You may wipe the lids gently with a warm wet cloth to remove matter from eyelashes.    Continue with your usual diet and medications prescribed by your doctor.    Sunglasses will increase your comfort post-operatively.    Call your surgeon at [phone] or the answering service for any problems, especially pain.

## 2024-12-31 NOTE — LETTER
To whom it may concern,    Garrett Travis was seen in our clinic and had surgery which required him to travel and he will have postoperative restrictions from 12/30/24 until 1/8/25.    He will have surgery again with the same travel requirements and postoperative restrictions from 2/5/25 until 2/12/25.       Electronically signed,  Jacquelyn Díaz MD

## 2025-01-06 ENCOUNTER — OFFICE VISIT (OUTPATIENT)
Dept: OPHTHALMOLOGY | Facility: CLINIC | Age: 65
End: 2025-01-06
Payer: MEDICAID

## 2025-01-06 ENCOUNTER — TELEPHONE (OUTPATIENT)
Dept: OPHTHALMOLOGY | Facility: CLINIC | Age: 65
End: 2025-01-06

## 2025-01-06 DIAGNOSIS — H02.403 PTOSIS OF BOTH EYELIDS: Primary | ICD-10-CM

## 2025-01-06 DIAGNOSIS — Z98.890 POSTOPERATIVE EYE STATE: ICD-10-CM

## 2025-01-06 ASSESSMENT — VISUAL ACUITY
OS_CC: 5/200
OD_CC: 20/150
OD_PH_CC+: -1
OD_PH_CC: 20/125
CORRECTION_TYPE: GLASSES
METHOD: SNELLEN - LINEAR

## 2025-01-06 ASSESSMENT — TONOMETRY
OD_IOP_MMHG: 21
OS_IOP_MMHG: 15
IOP_METHOD: ICARE

## 2025-01-06 ASSESSMENT — REFRACTION_WEARINGRX
OS_SPHERE: -4.50
OD_SPHERE: -0.75
SPECS_TYPE: PAL
OD_ADD: +3.00
OS_CYLINDER: +1.50
OD_AXIS: 028
OD_CYLINDER: +1.50
OS_ADD: +3.00
OS_AXIS: 105

## 2025-01-06 ASSESSMENT — EXTERNAL EXAM - RIGHT EYE: OD_EXAM: NORMAL

## 2025-01-06 ASSESSMENT — EXTERNAL EXAM - LEFT EYE: OS_EXAM: NORMAL

## 2025-01-06 NOTE — NURSING NOTE
Chief Complaints and History of Present Illnesses   Patient presents with    Post Op (Ophthalmology) Both Eyes     Status post bilateral lower eyelid retraction repair on 11/6/2024     Chief Complaint(s) and History of Present Illness(es)       Post Op (Ophthalmology) Both Eyes              Laterality: both eyes    Onset: 2 months ago    Severity: mild    Frequency: constantly    Course: stable    Associated symptoms: Negative for eye pain    Response to treatment: mild improvement    Comments: Status post bilateral lower eyelid retraction repair on 11/6/2024              Comments    Lids healing well.  Vision is still down.  Just had glaucoma surgery left eye and will be having right eye glaucoma surgery.      Gali Lindsay on 1/6/2025 at 11:25 AM

## 2025-01-06 NOTE — PROGRESS NOTES
Chief Complaints and History of Present Illnesses   Patient presents with    Post Op (Ophthalmology) Both Eyes     Status post bilateral lower eyelid retraction repair on 11/6/2024     Chief Complaint(s) and History of Present Illness(es)     Post Op (Ophthalmology) Both Eyes    In both eyes.  This started 2 months ago.  Severity is mild.  Occurring   constantly.  Since onset it is stable.  Associated symptoms include   Negative for eye pain.  Response to treatment was mild improvement.   Additional comments: Status post bilateral lower eyelid retraction repair   on 11/6/2024    Comments    Lids healing well.  Vision is still down.  Just had glaucoma surgery left   eye and will be having right eye glaucoma surgery.      Gali GOODMANAnastasia Lindsay on 1/6/2025 at 11:25 AM         FUNCTIONAL COMPLAINTS RELATED TO DROOPY EYELIDS/BROWS:  Ccuo Travis describes upper lids interfering with superior visual field and interfering with activities of daily living including reading, driving and watching television.     EXAM:     MRD1: Right eye 2 mm   Left eye 0 mm    VISUAL FIELD:  Right eye untaped:20 degrees Right eye taped:40 degrees  Left eye untaped:0 degrees Left eye taped:40 degrees    Right eye visual field improves by: 20 degrees  Left eye visual field improves by: 40 degrees    Assessment & Plan     Cuco Travis is a 64 year old male with the following diagnoses:   1. Ptosis of both eyelids    2. Postoperative eye state       Cuco Travis is 2 months status post Retraction repair with tarsoconjunctival flap, bilateral lower eyelid  The incision(s) are well healed.  The lower eyelids are in excellent position.    Following glaucoma surgery left eye, patient has noticed progression in GAUTAM ptosis which interferes with his vision and affects his ADLs.  Glaucoma surgery right eye scheduled for mid-February.     Will plan for ptosis repair each eye with levator resection in April.           Bridget Mcfadden MD  Oculoplastic  Surgery Fellow    Attending Physician Attestation:  I have seen and examined this patient with the fellow .  I have confirmed and edited as necessary the chief complaint(s), history of present illness, review of systems, relevant history, and examination findings as documented by others.  I have personally reviewed the relevant tests, images, and reports as documented above.  I have confirmed and edited as necessary the assessment and plan and agree with this note.    - Damian Recio MD 12:05 PM 1/6/2025    Today with Cuco Travis  and his family, I reviewed the indications, risks, benefits, and alternatives of the proposed surgical procedure including, but not limited to, failure obtain the desired result  and need for additional surgery, bleeding, infection, loss of vision, loss of the eye, and the remote possibility of permanent damage to any organ system or death with the use of anesthesia.  I provided multiple opportunities for the questions, answered all questions to the best of my ability, and confirmed that my answers and my discussion were understood.     - Damian Recio MD 12:05 PM 1/6/2025

## 2025-01-06 NOTE — TELEPHONE ENCOUNTER
Called patient to schedule surgery with Dr Recio    Spoke with: Shadia    Date(s) of Surgery: 4/2/25    Patient aware of approximate arrival time: Yes      Location of surgery: [facility]     Pre-Op H&P: [facility] [place] [place]      Informed patient that they need to call to schedule pre-op H&P within 30 days of surgery date: Yes      Post-Op Appt Dates: 4/14 phone/photos       Discussed with patient pre-op RN will call 2-3 days prior to surgery with arrival time and instructions:  Yes       Standard Surgery Packet Sent: Yes 01/06/25  via Mail - Standard      Additional Information Sent in Packet: Post-op Appointment Itinerary      Informed patient that they will need an adult  to bring patient home from surgery: Yes  : Shadia         Additional Comments:  Shadia requested a packet as well as she will be bring pt to appts.       All patients questions were answered and was instructed to review surgical packet and call back [phone] with any questions or concerns.       Kezia Rider on 1/6/2025 at 3:52 PM

## 2025-01-07 ENCOUNTER — OFFICE VISIT (OUTPATIENT)
Dept: OPHTHALMOLOGY | Facility: CLINIC | Age: 65
End: 2025-01-07
Payer: MEDICAID

## 2025-01-07 DIAGNOSIS — H40.1133 PRIMARY OPEN ANGLE GLAUCOMA (POAG) OF BOTH EYES, SEVERE STAGE: ICD-10-CM

## 2025-01-07 DIAGNOSIS — Z98.890 POSTOPERATIVE EYE STATE: Primary | ICD-10-CM

## 2025-01-07 DIAGNOSIS — Z96.1 PSEUDOPHAKIA OF BOTH EYES: ICD-10-CM

## 2025-01-07 PROCEDURE — G0463 HOSPITAL OUTPT CLINIC VISIT: HCPCS

## 2025-01-07 ASSESSMENT — REFRACTION_WEARINGRX
OS_CYLINDER: +1.50
SPECS_TYPE: PAL
OS_AXIS: 105
OD_CYLINDER: +1.50
OD_AXIS: 028
OS_ADD: +3.00
OD_ADD: +3.00
OS_SPHERE: -4.50
OD_SPHERE: -0.75

## 2025-01-07 ASSESSMENT — TONOMETRY
OS_IOP_MMHG: 18
OD_IOP_MMHG: 22
OD_IOP_MMHG: 19
IOP_METHOD: APPLANATION
IOP_METHOD: TONOPEN
OD_IOP_MMHG: 24
IOP_METHOD: APPLANATION
OS_IOP_MMHG: 15
OS_IOP_MMHG: 18

## 2025-01-07 ASSESSMENT — VISUAL ACUITY
OD_CC: 20/150
OD_PH_CC: 20/100
OD_PH_CC+: -1
METHOD: SNELLEN - LINEAR
OS_CC: 20/300
CORRECTION_TYPE: GLASSES

## 2025-01-07 ASSESSMENT — EXTERNAL EXAM - LEFT EYE: OS_EXAM: NORMAL

## 2025-01-07 ASSESSMENT — EXTERNAL EXAM - RIGHT EYE: OD_EXAM: NORMAL

## 2025-01-07 NOTE — NURSING NOTE
Chief Complaints and History of Present Illnesses   Patient presents with    Post Op (Ophthalmology) Left Eye     POP Left eye Tube on 12/30/24     Chief Complaint(s) and History of Present Illness(es)       Post Op (Ophthalmology) Left Eye              Comments: POP Left eye Tube on 12/30/24              Comments    The patient notes there is some soreness and GAUTAM drooping.    The patient notes his vision is still blurry and grey.  He was seeing better before surgery.  The patient is using PF Cosopt twice daily, PF Iyazeh at bedtime, Restasis twice daily and Refresh Plus in both eyes.  The patient is using POP drops Prednisolone and Moxifloxacin four times daily.  He started taking Diamox twice daily.  Clarissa Thakkar, COA, COA 9:01 AM 01/07/2025

## 2025-01-07 NOTE — PATIENT INSTRUCTIONS
Reduce prednisolone (pink top) eye drop to 4 times per day in the left eye     Stop moxifloxacin (tan top) left eye     Continue PF Timolol-Dorzolamide 1 drop twice daily in each eye    Continue PF Iyuzeh at bedtime each eye    Continue Diamox 500 mg 1 tablet twice daily

## 2025-01-07 NOTE — PROGRESS NOTES
Chief Complaint/Presenting Concern: Post-operative Visit    History of Present Illness:   Cuco Travis is a 64 year old patient who presents for a glaucoma evaluation. He previously had cataract surgery of both eyes in in 2007 in [place] then he was managed by Dr. Pisano who performed trabeculectomy right eye in 2009 and the left eye in 2010. He then was followed by Dr. Lew in [facility].Seen by Dr. 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    Today, 01/07/2024, POW1 s/p BGI left eye 12/30/2024.. Doing well. Report Good Compliance with medication. Started Diamox at POD1. Scheduled for right eye BGI 02/05/2025.     Current Medications:  PF Timolol-Dorzolamide BID each eye  PF Iyuzeh at bedtime each eye  Prednisolone QID left eye   Moxifloxacin QID left eye   Diamox 500 mg BID po    Relevant Past Medical/Family/Social History:  Thyroid Disease, HTN, Type II DM    Relevant Review of Systems: Non Applicable.      Diagnosis: Primary open angle glaucoma , severe stage each eye   Previous glaucoma surgery/laser  S/p trabeculectomy right eye (2009), Dr. Pisano  S/p trabeculectomy left eye with mitomycin (2010), Dr. Pisano  S/p CEIOL both eyes (2007) done at [facility] in [place], [place]   S/p BGI left eye 12/31/24 Dr. Díaz  Maximum intraocular pressure 31/32  Currently Meds: PF Timolol-Dorzolamide BID right eye, PF Iyuzeh at bedtime right eye   Family history: positive, Father  /546  Gonio: Open to scleral spur each eye   Trauma history: negative  Steroid exposure: positive topical prednisolone   Vasospastic disease: Migrane/Raynaud phenomenon: negative  A past hemodynamic crisis or Low BP:: negative  Meds AEs/intolerance: None  PMHx: DMII  Asthma and respiratory problems: None  Cardiac: None  Renal: None  Kidney stones/Sulfa Allergy: None  Anticoagulants: None    Prior  testing   Low Vision Visual field 12/12/2024:   Right eye - Dense Inferior Arcuate and Superior Arcuate Defect, [place] of vision inferior, adequate reliability, baseline testing  Left eye - Dense Inferior Arc[place], Superior Paracentral field of vision, adequate reliability, baseline testing   OCT Optic Nerve RNFL Spectralis 12/12/2024  Right eye:  Diffuse Severe RNFL Thinning. Poor Tracing, Baseline Testing.   Left eye: Diffuse Severe RNFL Thinning. Poor Tracing, Baseline Testing.     Additional Ocular History:   # Corneal neovascularization   -secondary to severe dry eye and medicamentosa from glaucoma drops (recently PF drops mid August 2024).   - follows with cornea     # Floppy eyelid syndrome of both eyes     #Senile ectropion of both lower eyelids   -s/p Bilateral lower lids LATERAL TARSAl STRIP + PATRICIA FLAP 11/06/2024 Dr. Recio.    #Ptosis of left eyelid     Plan/Recommendations:  Discussed findings with patient.  Severe glaucoma each eye uncontrolled on drops and Diamox. Given medicamentosa will aim to take patient off glaucoma drops.   POW1 s/p BGI left eye. Doing well.   Continue PF Timolol-Dorzolamide BID each eye  Continue PF Iyuzeh at bedtime each eye  Continue Diamox 500 mg BID   Continue Prednisolone QID /day left eye   Stop Moxifloxacin left eye   Postop precautions and instructions given to the patient     RTC as scheduled with Dr. Díaz on 02/06/2025 for POM1 BGI left eye and POD1 BGI right eye.    Physician Attestation     Complete documentation of historical and exam elements from today's encounter can be found in the full encounter summary report (not reduplicated in this progress note). I personally obtained the chief complaint(s) and history of present illness. I confirmed and edited as necessary the review of systems, past medical/surgical history, family history, social history, and examination findings as document by others; and I examined the patient myself. I personally  reviewed the relevant tests, images, and reports as documented above. I formulated and edited as necessary the assessment and plan and discussed the findings and management plan with the patient and family.    Robert Duque OD

## 2025-02-01 DIAGNOSIS — H40.1133 PRIMARY OPEN ANGLE GLAUCOMA (POAG) OF BOTH EYES, SEVERE STAGE: Primary | ICD-10-CM

## 2025-02-01 RX ORDER — MOXIFLOXACIN 5 MG/ML
1 SOLUTION/ DROPS OPHTHALMIC 4 TIMES DAILY
Qty: 3 ML | Refills: 0 | Status: SHIPPED | OUTPATIENT
Start: 2025-02-05

## 2025-02-01 RX ORDER — PREDNISOLONE ACETATE 10 MG/ML
1 SUSPENSION/ DROPS OPHTHALMIC
Qty: 10 ML | Refills: 0 | Status: SHIPPED | OUTPATIENT
Start: 2025-02-05

## 2025-02-04 ENCOUNTER — ANESTHESIA EVENT (OUTPATIENT)
Dept: SURGERY | Facility: AMBULATORY SURGERY CENTER | Age: 65
End: 2025-02-04
Payer: MEDICAID

## 2025-02-05 ENCOUNTER — HOSPITAL ENCOUNTER (OUTPATIENT)
Facility: AMBULATORY SURGERY CENTER | Age: 65
Discharge: HOME | End: 2025-02-05
Attending: OPHTHALMOLOGY
Payer: MEDICAID

## 2025-02-05 ENCOUNTER — ANESTHESIA (OUTPATIENT)
Dept: SURGERY | Facility: AMBULATORY SURGERY CENTER | Age: 65
End: 2025-02-05
Payer: MEDICAID

## 2025-02-05 VITALS
HEART RATE: 95 BPM | HEIGHT: 67 IN | RESPIRATION RATE: 16 BRPM | BODY MASS INDEX: 42.91 KG/M2 | WEIGHT: 273.37 LBS | OXYGEN SATURATION: 94 % | DIASTOLIC BLOOD PRESSURE: 95 MMHG | SYSTOLIC BLOOD PRESSURE: 121 MMHG | TEMPERATURE: 97.3 F

## 2025-02-05 LAB — GLUCOSE BLDC GLUCOMTR-MCNC: 168 MG/DL (ref 70–99)

## 2025-02-05 PROCEDURE — 82962 GLUCOSE BLOOD TEST: CPT | Performed by: PATHOLOGY

## 2025-02-05 DEVICE — GLAUCOMA IMPLANT BG101-350 GLOBAL
Type: IMPLANTABLE DEVICE | Site: EYE | Status: FUNCTIONAL
Brand: BAERVELDT GLAUCOMA IMPLANT (350)

## 2025-02-05 RX ORDER — DEXAMETHASONE SODIUM PHOSPHATE 10 MG/ML
4 INJECTION, SOLUTION INTRAMUSCULAR; INTRAVENOUS
Status: DISCONTINUED | OUTPATIENT
Start: 2025-02-05 | End: 2025-02-05 | Stop reason: HOSPADM

## 2025-02-05 RX ORDER — ONDANSETRON 4 MG/1
4 TABLET, ORALLY DISINTEGRATING ORAL EVERY 30 MIN PRN
Status: DISCONTINUED | OUTPATIENT
Start: 2025-02-05 | End: 2025-02-06 | Stop reason: HOSPADM

## 2025-02-05 RX ORDER — TETRACAINE HYDROCHLORIDE 5 MG/ML
SOLUTION OPHTHALMIC PRN
Status: DISCONTINUED | OUTPATIENT
Start: 2025-02-05 | End: 2025-02-05 | Stop reason: HOSPADM

## 2025-02-05 RX ORDER — OXYCODONE HYDROCHLORIDE 5 MG/1
5 TABLET ORAL
Status: DISCONTINUED | OUTPATIENT
Start: 2025-02-05 | End: 2025-02-06 | Stop reason: HOSPADM

## 2025-02-05 RX ORDER — DEXAMETHASONE SODIUM PHOSPHATE 10 MG/ML
4 INJECTION, SOLUTION INTRAMUSCULAR; INTRAVENOUS
Status: DISCONTINUED | OUTPATIENT
Start: 2025-02-05 | End: 2025-02-06 | Stop reason: HOSPADM

## 2025-02-05 RX ORDER — ONDANSETRON 4 MG/1
4 TABLET, ORALLY DISINTEGRATING ORAL EVERY 30 MIN PRN
Status: DISCONTINUED | OUTPATIENT
Start: 2025-02-05 | End: 2025-02-05 | Stop reason: HOSPADM

## 2025-02-05 RX ORDER — LIDOCAINE 40 MG/G
CREAM TOPICAL
Status: DISCONTINUED | OUTPATIENT
Start: 2025-02-05 | End: 2025-02-05 | Stop reason: HOSPADM

## 2025-02-05 RX ORDER — ACETAMINOPHEN 325 MG/1
975 TABLET ORAL ONCE
Status: COMPLETED | OUTPATIENT
Start: 2025-02-05 | End: 2025-02-05

## 2025-02-05 RX ORDER — SODIUM CHLORIDE, SODIUM LACTATE, POTASSIUM CHLORIDE, CALCIUM CHLORIDE 600; 310; 30; 20 MG/100ML; MG/100ML; MG/100ML; MG/100ML
INJECTION, SOLUTION INTRAVENOUS CONTINUOUS
Status: DISCONTINUED | OUTPATIENT
Start: 2025-02-05 | End: 2025-02-05 | Stop reason: HOSPADM

## 2025-02-05 RX ORDER — NALOXONE HYDROCHLORIDE 0.4 MG/ML
0.1 INJECTION, SOLUTION INTRAMUSCULAR; INTRAVENOUS; SUBCUTANEOUS
Status: DISCONTINUED | OUTPATIENT
Start: 2025-02-05 | End: 2025-02-06 | Stop reason: HOSPADM

## 2025-02-05 RX ORDER — NALOXONE HYDROCHLORIDE 0.4 MG/ML
0.1 INJECTION, SOLUTION INTRAMUSCULAR; INTRAVENOUS; SUBCUTANEOUS
Status: DISCONTINUED | OUTPATIENT
Start: 2025-02-05 | End: 2025-02-05 | Stop reason: HOSPADM

## 2025-02-05 RX ORDER — ONDANSETRON 2 MG/ML
4 INJECTION INTRAMUSCULAR; INTRAVENOUS EVERY 30 MIN PRN
Status: DISCONTINUED | OUTPATIENT
Start: 2025-02-05 | End: 2025-02-05 | Stop reason: HOSPADM

## 2025-02-05 RX ORDER — BALANCED SALT SOLUTION 6.4; .75; .48; .3; 3.9; 1.7 MG/ML; MG/ML; MG/ML; MG/ML; MG/ML; MG/ML
SOLUTION OPHTHALMIC PRN
Status: DISCONTINUED | OUTPATIENT
Start: 2025-02-05 | End: 2025-02-05 | Stop reason: HOSPADM

## 2025-02-05 RX ORDER — ONDANSETRON 2 MG/ML
4 INJECTION INTRAMUSCULAR; INTRAVENOUS EVERY 30 MIN PRN
Status: DISCONTINUED | OUTPATIENT
Start: 2025-02-05 | End: 2025-02-06 | Stop reason: HOSPADM

## 2025-02-05 RX ORDER — PROPOFOL 10 MG/ML
INJECTION, EMULSION INTRAVENOUS PRN
Status: DISCONTINUED | OUTPATIENT
Start: 2025-02-05 | End: 2025-02-05

## 2025-02-05 RX ORDER — DEXAMETHASONE SODIUM PHOSPHATE 4 MG/ML
INJECTION, SOLUTION INTRA-ARTICULAR; INTRALESIONAL; INTRAMUSCULAR; INTRAVENOUS; SOFT TISSUE PRN
Status: DISCONTINUED | OUTPATIENT
Start: 2025-02-05 | End: 2025-02-05 | Stop reason: HOSPADM

## 2025-02-05 RX ORDER — OXYCODONE HYDROCHLORIDE 5 MG/1
10 TABLET ORAL
Status: DISCONTINUED | OUTPATIENT
Start: 2025-02-05 | End: 2025-02-06 | Stop reason: HOSPADM

## 2025-02-05 RX ORDER — LIDOCAINE HYDROCHLORIDE 20 MG/ML
INJECTION, SOLUTION INFILTRATION; PERINEURAL PRN
Status: DISCONTINUED | OUTPATIENT
Start: 2025-02-05 | End: 2025-02-05

## 2025-02-05 RX ORDER — FENTANYL CITRATE 50 UG/ML
25 INJECTION, SOLUTION INTRAMUSCULAR; INTRAVENOUS EVERY 5 MIN PRN
Status: DISCONTINUED | OUTPATIENT
Start: 2025-02-05 | End: 2025-02-05 | Stop reason: HOSPADM

## 2025-02-05 RX ORDER — KETAMINE HYDROCHLORIDE 10 MG/ML
INJECTION INTRAMUSCULAR; INTRAVENOUS PRN
Status: DISCONTINUED | OUTPATIENT
Start: 2025-02-05 | End: 2025-02-05

## 2025-02-05 RX ORDER — HYDROMORPHONE HYDROCHLORIDE 1 MG/ML
0.2 INJECTION, SOLUTION INTRAMUSCULAR; INTRAVENOUS; SUBCUTANEOUS EVERY 5 MIN PRN
Status: DISCONTINUED | OUTPATIENT
Start: 2025-02-05 | End: 2025-02-05 | Stop reason: HOSPADM

## 2025-02-05 RX ORDER — HYDROMORPHONE HYDROCHLORIDE 1 MG/ML
0.4 INJECTION, SOLUTION INTRAMUSCULAR; INTRAVENOUS; SUBCUTANEOUS EVERY 5 MIN PRN
Status: DISCONTINUED | OUTPATIENT
Start: 2025-02-05 | End: 2025-02-05 | Stop reason: HOSPADM

## 2025-02-05 RX ORDER — FENTANYL CITRATE 50 UG/ML
50 INJECTION, SOLUTION INTRAMUSCULAR; INTRAVENOUS EVERY 5 MIN PRN
Status: DISCONTINUED | OUTPATIENT
Start: 2025-02-05 | End: 2025-02-05 | Stop reason: HOSPADM

## 2025-02-05 RX ORDER — NEOMYCIN SULFATE, POLYMYXIN B SULFATE, AND DEXAMETHASONE 3.5; 10000; 1 MG/G; [USP'U]/G; MG/G
OINTMENT OPHTHALMIC PRN
Status: DISCONTINUED | OUTPATIENT
Start: 2025-02-05 | End: 2025-02-05 | Stop reason: HOSPADM

## 2025-02-05 RX ADMIN — PROPOFOL 40 MG: 10 INJECTION, EMULSION INTRAVENOUS at 10:13

## 2025-02-05 RX ADMIN — Medication 100 MCG: at 10:21

## 2025-02-05 RX ADMIN — ACETAMINOPHEN 975 MG: 325 TABLET ORAL at 08:58

## 2025-02-05 RX ADMIN — Medication 200 MCG: at 10:28

## 2025-02-05 RX ADMIN — KETAMINE HYDROCHLORIDE 20 MG: 10 INJECTION INTRAMUSCULAR; INTRAVENOUS at 10:10

## 2025-02-05 RX ADMIN — LIDOCAINE HYDROCHLORIDE 60 MG: 20 INJECTION, SOLUTION INFILTRATION; PERINEURAL at 10:13

## 2025-02-05 RX ADMIN — SODIUM CHLORIDE, SODIUM LACTATE, POTASSIUM CHLORIDE, CALCIUM CHLORIDE: 600; 310; 30; 20 INJECTION, SOLUTION INTRAVENOUS at 10:07

## 2025-02-05 RX ADMIN — Medication 100 MCG: at 10:34

## 2025-02-05 NOTE — DISCHARGE INSTRUCTIONS
Discharge Instructions Following Surgery      Tylenol 975 mg given at 9AM.   Ok to take more after 3PM.    Date: 2/5/2025    EYE MEDICATIONS:  We will discuss how to use your eyedrops at your postoperative visit tomorrow. Continue all drops in the non-operative eye as you were previously prescribed. Continue diamox pill.      EYE PATCH: Leave the patch on, we will remove the patch in clinic tomorrow.     For 5 days: Wear your glasses or leave the eye uncovered while awake.    Wear the eye shield every night and during daytime naps.  DO NOT use padding under eye shield.    You may notice blurred or double vision initially, this will gradually clear.     If you experience any severe pain, sudden decrease in vision, or discharge for the eye, contact your doctor immediately.     Minor itching, scratching, burning etc. is normal. Use regular or extra-strength Tylenol for discomfort.    Refrain from heavy lifting, excessive bending over, and heavy exertion for 1 week.    You may bathe or shower but do not get the eye wet.    Do not rub or push on the operative eye for 1 week.  You may wipe the lids gently with a warm wet cloth to remove matter from eyelashes.    Continue with your usual diet and medications prescribed by your doctor.    Sunglasses will increase your comfort post-operatively.    Post Operative Visit on 2/6/25  Bring all material and medications to the office on the first post-op day.  Call your surgeon at [phone] or the answering service for any problems, especially pain.  [facility]  Home Care Following Anesthesia  For 24 hours after surgery:  Get plenty of rest.  A responsible adult must stay with you for at least 24 hours after you leave the surgery center.  Do not drive or use heavy equipment.  If you have weakness or tingling, don't drive or use heavy equipment until this feeling goes away.   Do not drink alcohol.   Avoid strenuous or risky activities.  Ask for  "help when climbing stairs.  You may feel lightheaded.  IF so, sit for a few minutes before standing.  Have someone help you get up.   If you have nausea (feel sick to your stomach): Drink only clear liquids such as apple juice, ginger ale, broth or 7-Up.  Rest may also help.  Be sure to drink enough fluids.  Move to a regular diet as you feel able.   You may have a slight fever.  Call the doctor if your fever is over 100 F (37.7 C) (taken under the tongue) or lasts longer than 24 hours.  You may have a dry mouth, a sore throat, muscle aches or trouble sleeping. These should go away after 24 hours.  Do not make important or legal decisions.   It is recommended to avoid smoking.        Today you received a Marcaine or bupivacaine block to numb the nerves near your surgery site.  This is a block using local anesthetic or \"numbing\" medication injected around the nerves to anesthetize or \"numb\" the area supplied by those nerves.  This block is injected into the muscle layer near your surgical site.  The medication may numb the location where you had surgery for 6-18 hours, but may last up to 24 hours.  If your surgical site is an arm or leg you should be careful with your affected limb, since it is possible to injure your limb without being aware of it due to the numbing.  Until full feeling returns, you should guard against bumping or hitting your limb, and avoid extreme hot or cold temperatures on the skin.  As the block wears off, the feeling will return as a tingling or prickly sensation near your surgical site.  You will experience more discomfort from your incision as the feeling returns.  You may want to take a pain pill (a narcotic or Tylenol if this was prescribed by your surgeon) when you start to experience mild pain before the pain beccomes more severe.  If your pain medications do not control your pain you should notifiy your surgeon.    Tips for taking pain medications  To get the best pain relief possible, " remember these points:  Take pain medications as directed, before pain becomes severe.  Pain medication can upset your stomach: taking it with food may help.  Constipation is a common side effect of pain medication. Drink plenty of  fluids.  Eat foods high in fiber. Take a stool softener if recommended by your doctor or pharmacist.  Do not drink alcohol, drive or operate machinery while taking pain medications.  Ask about other ways to control pain, such as with heat, ice or relaxation.    Tylenol/Acetaminophen Consumption    If you feel your pain relief is insufficient, you may take Tylenol/Acetaminophen in addition to your narcotic pain medication.   Be careful not to exceed 4,000 mg of Tylenol/Acetaminophen in a 24 hour period from all sources.  If you are taking extra strength Tylenol/acetaminophen (500 mg), the maximum dose is 8 tablets in 24 hours.  If you are taking regular strength acetaminophen (325 mg), the maximum dose is 12 tablets in 24 hours.    Call a doctor for any of the following:  Signs of infection (fever, growing tenderness at the surgery site, a large amount of drainage or bleeding, severe pain, foul-smelling drainage, redness, swelling).  It has been over 8 to 10 hours since surgery and you are still not able to urinate (pass water).  Headache for over 24 hours.  Numbness, tingling or weakness the day after surgery (if you had spinal anesthesia).  Signs of Covid-19 infection (temperature over 100 degrees, shortness of breath, cough, loss of taste/smell, generalized body aches, persistent headache, chills, sore throat, nausea/vomiting/diarrhea)    Your doctor is:       Dr. Jacquelyn Díaz, Ophthalmology: [phone]               After hours and weekends call the hospital @ [phone] and ask for the resident on call for:  Ophthalmology  For emergency care, call the:  [facility] [place] [facility] Department: [phone] (TTY for hearing impaired: [phone])

## 2025-02-05 NOTE — OP NOTE
Cuco Travis  [id number]  2/5/2025    PREOPERATIVE DIAGNOSIS: Primary Open Angle Glaucoma right eye, severe stage     POSTOPERATIVE DIAGNOSIS:Same as pre-operative diagnosis    OPERATION PERFORMED: Procedure(s):  RIGHT EYE INSERTION, IMPLANT, EYE VALVE    SURGEON:  Jacquelyn Díaz MD- Primary Surgeon  Martínez Gabriel MD- Assisting resident     ANESTHESIA: MAC with block    COMPLICATIONS:  none    FINDINGS:  Anatomy as expected    ESTIMATED BLOOD LOSS:  minimal    SPECIMENS:  * No specimens in log *    IMPLANTS:  Implant Name Type Inv. Item Serial No.  Lot No. LRB No. Used Action   EYE IMP VALVE BAERVELDT 350 -401 - [id number] Lens/Eye Implant EYE IMP VALVE BAERVELDT 350 -183 [id number] [employer]  Right 1 Implanted   CorneaGen Split thick half moon human cornea 9mm    [id number]   Right 1 Implanted      PROCEDURE:  Local anesthesia  Betadine paint and drop in holding room  Prep and drape in usual manner in OR  Time out according to protocol  Superior traction suture  Superotemporal fornix-based conjunctival flap.  Bleeding controlled with cautery  Adequate pocket created using blunt and sharp dissection.   Superior rectus and lateral rectusmuscles isolated.  Baerveldt implant inserted under the muscles  Baerveldt implant sutured onto the globe using 8-0 nylon sutures with the knot rotated into the well of the implant.  Tube ligated with 7-0 vicryl and tested for adequacy of ligature with balanced salt solution on 30 gauge cannula  Tube trimmed with bevel up  23 gauge needle track made into the anterior chamber   Tube threaded into the eye  Tube secured to globe with a 7-0 vicryl suture  No fenestrations made with a spatula needle   Corneal patch graft placed over the anterior portion of the tube and secured with 7-0 vicryl  Conjunctiva closed with 8-0 vicryl interrupted sutures  Subconjunctival injection of antibiotic and steroid  Removal of traction suture  Topical  antibiotic/steroid ointment  Dry sterile dressing  The patient tolerated the procedure well. There were no unexpected findings or complications.

## 2025-02-05 NOTE — ANESTHESIA POSTPROCEDURE EVALUATION
Patient: Cuco Travis    Procedure: Procedure(s):  RIGHT EYE INSERTION, IMPLANT, EYE VALVE       Anesthesia Type:  MAC    Note:  Disposition: Outpatient   Postop Pain Control: Uneventful            Sign Out: Well controlled pain   PONV: No   Neuro/Psych:    Airway/Respiratory: Uneventful            Sign Out: Acceptable/Baseline resp. status   CV/Hemodynamics: Uneventful            Sign Out: Acceptable CV status; No obvious hypovolemia; No obvious fluid overload   Other NRE: NONE   DID A NON-ROUTINE EVENT OCCUR? No           Last vitals:  Vitals Value Taken Time   /78 02/05/25 1130   Temp 36.2  C (97.1  F) 02/05/25 1130   Pulse 91 02/05/25 1130   Resp 16 02/05/25 1130   SpO2 94 % 02/05/25 1137   Vitals shown include unfiled device data.    Electronically Signed By: Sabina Underwood MD  February 5, 2025  11:37 AM

## 2025-02-05 NOTE — ANESTHESIA PREPROCEDURE EVALUATION
Anesthesia Pre-Procedure Evaluation    Patient: Cuco Travis   MRN: [id number] : 1960        Procedure : Procedure(s):  RIGHT EYE INSERTION, IMPLANT, EYE VALVE          Past Medical History:   Diagnosis Date    Diabetes (H)     Disorder of thyroid     HTN (hypertension)       Past Surgical History:   Procedure Laterality Date    ANKLE SURGERY      crushed with forklift    CATARACT IOL, RT/LT      HERNIA REPAIR      IMPLANT VALVE EYE Left 2024    Procedure: LEFT EYE INSERTION, BAERVEDLT TUBE SHUNT IMPLANT;  Surgeon: Jacquelyn Díaz MD;  Location: [facility] OR    REPAIR ECTROPION BILATERAL Bilateral 2024    Procedure: REPAIR, ECTROPION, EYE, BILATERAL LOWER EYELIDS WITH TARSOCONJUNCTIVAL FLAP;  Surgeon: Damian Recio MD;  Location: [facility] OR    TRABECULECTOMY        Allergies   Allergen Reactions    Egg Yolk      Pt is allergic to eggs, however he can them in cakes.       Social History     Tobacco Use    Smoking status: Never    Smokeless tobacco: Never   Substance Use Topics    Alcohol use: Yes      Wt Readings from Last 1 Encounters:   25 124 kg (273 lb 5.9 oz)        Anesthesia Evaluation   Pt has had prior anesthetic. Type: MAC.    No history of anesthetic complications       ROS/MED HX  ENT/Pulmonary:     (+) sleep apnea, uses CPAP,                                      Neurologic:       Cardiovascular:     (+)  hypertension- -   -  - -                                 Previous cardiac testing     METS/Exercise Tolerance: 4 - Raking leaves, gardening    Hematologic:       Musculoskeletal:       GI/Hepatic:       Renal/Genitourinary:       Endo:     (+)  type II DM,        thyroid problem,     Obesity,       Psychiatric/Substance Use:       Infectious Disease:       Malignancy:       Other:            Physical Exam    Airway        Mallampati: III   TM distance: > 3 FB   Neck ROM: full   Mouth opening: > 3 cm    Respiratory Devices and Support         Dental       (+) Minor  "Abnormalities - some fillings, tiny chips      Cardiovascular   cardiovascular exam normal          Pulmonary   pulmonary exam normal                OUTSIDE LABS:  CBC: No results found for: \"WBC\", \"HGB\", \"HCT\", \"PLT\"  BMP:   Lab Results   Component Value Date     (H) 12/30/2024     (H) 11/06/2024     COAGS: No results found for: \"PTT\", \"INR\", \"FIBR\"  POC: No results found for: \"BGM\", \"HCG\", \"HCGS\"  HEPATIC: No results found for: \"ALBUMIN\", \"PROTTOTAL\", \"ALT\", \"AST\", \"GGT\", \"ALKPHOS\", \"BILITOTAL\", \"BILIDIRECT\", \"STARLA\"  OTHER: No results found for: \"PH\", \"LACT\", \"A1C\", \"ROSSY\", \"PHOS\", \"MAG\", \"LIPASE\", \"AMYLASE\", \"TSH\", \"T4\", \"T3\", \"CRP\", \"SED\"    Anesthesia Plan    ASA Status:  3    NPO Status:  NPO Appropriate    Anesthesia Type: MAC.     - Reason for MAC: straight local not clinically adequate   Induction: Intravenous.   Maintenance: TIVA.        Consents    Anesthesia Plan(s) and associated risks, benefits, and realistic alternatives discussed. Questions answered and patient/representative(s) expressed understanding.     - Discussed: Risks, Benefits and Alternatives for BOTH SEDATION and the PROCEDURE were discussed     - Discussed with:  Patient            Postoperative Care    Pain management: IV analgesics, Oral pain medications.     - Plan for long acting post-op opioid use   PONV prophylaxis: Ondansetron (or other 5HT-3), Background Propofol Infusion     Comments:               Sabina Underwood MD    I have reviewed the pertinent notes and labs in the chart from the past 30 days and (re)examined the patient.  Any updates or changes from those notes are reflected in this note.    Clinically Significant Risk Factors Present on Admission                   # Hypertension: Home medication list includes antihypertensive(s)           # Severe Obesity: Estimated body mass index is 42.82 kg/m  as calculated from the following:    Height as of this encounter: 1.702 m (5' 7\").    Weight as of this " encounter: 124 kg (273 lb 5.9 oz).

## 2025-02-05 NOTE — ANESTHESIA CARE TRANSFER NOTE
Patient: Cuco Travis    Procedure: Procedure(s):  RIGHT EYE INSERTION, IMPLANT, EYE VALVE       Diagnosis: Primary open angle glaucoma (POAG) of right eye, severe stage [H40.1113]  Diagnosis Additional Information: No value filed.    Anesthesia Type:   MAC     Note:    Oropharynx: oropharynx clear of all foreign objects and spontaneously breathing  Level of Consciousness: awake  Oxygen Supplementation: room air    Independent Airway: airway patency satisfactory and stable  Dentition: dentition unchanged  Vital Signs Stable: post-procedure vital signs reviewed and stable  Report to RN Given: handoff report given  Patient transferred to: Phase II    Handoff Report: Identifed the Patient, Identified the Reponsible Provider, Reviewed the pertinent medical history, Discussed the surgical course, Reviewed Intra-OP anesthesia mangement and issues during anesthesia, Set expectations for post-procedure period and Allowed opportunity for questions and acknowledgement of understanding    See post op vitals  Vitals:  Vitals Value Taken Time   BP 98/84 02/05/25 1113   Temp     Pulse 85 02/05/25 1113   Resp     SpO2 93 % 02/05/25 1119   Vitals shown include unfiled device data.    Electronically Signed By: ION Arora CRNA  February 5, 2025  11:19 AM

## 2025-02-05 NOTE — BRIEF OP NOTE
[facility]    Brief Operative Note    Pre-operative diagnosis: Primary open angle glaucoma (POAG) of right eye, severe stage [H40.1113]  Post-operative diagnosis Same as pre-operative diagnosis    Procedure: RIGHT EYE INSERTION, IMPLANT, EYE VALVE, Right - Eye    Surgeon: Surgeons and Role:     * Jacquelyn Díaz MD - Primary     * Martínez Gabriel MD - Resident - Assisting  Anesthesia: MAC with Block   Estimated Blood Loss: Minimal    Drains: None  Specimens: * No specimens in log *  Findings:   None.  Complications: None.  Implants:   Implant Name Type Inv. Item Serial No.  Lot No. LRB No. Used Action   EYE IMP VALVE BAERVELDT 350 -926 - [id number] Lens/Eye Implant EYE IMP VALVE BAERVELDT 350 -083 [id number] [employer]  Right 1 Implanted   CorneaGen Split thick half moon human cornea 9mm    24 [id number]   Right 1 Implanted       Martínez Gabriel MD  Resident Physician, PGY-3  Department of Ophthalmology

## 2025-02-06 ENCOUNTER — OFFICE VISIT (OUTPATIENT)
Dept: OPHTHALMOLOGY | Facility: CLINIC | Age: 65
End: 2025-02-06
Attending: OPHTHALMOLOGY
Payer: MEDICAID

## 2025-02-06 DIAGNOSIS — H40.1133 PRIMARY OPEN ANGLE GLAUCOMA (POAG) OF BOTH EYES, SEVERE STAGE: ICD-10-CM

## 2025-02-06 DIAGNOSIS — Z98.890 POSTOPERATIVE EYE STATE: Primary | ICD-10-CM

## 2025-02-06 RX ORDER — PREDNISOLONE ACETATE 10 MG/ML
1 SUSPENSION/ DROPS OPHTHALMIC 4 TIMES DAILY
Qty: 10 ML | Refills: 2 | Status: SHIPPED | OUTPATIENT
Start: 2025-02-06

## 2025-02-06 ASSESSMENT — REFRACTION_WEARINGRX
SPECS_TYPE: PAL
OD_AXIS: 028
OS_AXIS: 105
OD_CYLINDER: +1.50
OS_ADD: +3.00
OD_ADD: +3.00
OS_CYLINDER: +1.50
OD_SPHERE: -0.75
OS_SPHERE: -4.50

## 2025-02-06 ASSESSMENT — VISUAL ACUITY
OD_SC: 20/100
OD_PH_SC: 20/50
OS_SC: 20/600
OD_PH_SC+: +2
METHOD: SNELLEN - LINEAR
OD_SC+: -1

## 2025-02-06 ASSESSMENT — EXTERNAL EXAM - LEFT EYE: OS_EXAM: NORMAL

## 2025-02-06 ASSESSMENT — TONOMETRY
OD_IOP_MMHG: 42
OS_IOP_MMHG: 28
OS_IOP_MMHG: 21
IOP_METHOD: TONOPEN
OD_IOP_MMHG: 37
IOP_METHOD: APPLANATION

## 2025-02-06 ASSESSMENT — EXTERNAL EXAM - RIGHT EYE: OD_EXAM: NORMAL

## 2025-02-06 NOTE — NURSING NOTE
Chief Complaints and History of Present Illnesses   Patient presents with    Post Op (Ophthalmology) Right Eye     Chief Complaint(s) and History of Present Illness(es)       Post Op (Ophthalmology) Right Eye              Laterality: right eye    Onset: 1 day ago              Comments    1 day s/p RIGHT EYE INSERTION, IMPLANT, EYE VALVE-Pt. States that he is doing well. No pain BE. Was able to sleep well.   Sarah Devine COT 9:10 AM February 6, 2025

## 2025-02-06 NOTE — PROGRESS NOTES
Chief Complaint/Presenting Concern: Post-operative Visit    History of Present Illness:   Cuco Travis is a 64 year old patient who presents for a glaucoma evaluation. He previously had cataract surgery of both eyes in in 2007 in [place] then he was managed by Dr. Pisano who performed trabeculectomy right eye in 2009 and the left eye in 2010. He then was followed by Dr. Lew in [facility].Seen by Dr. 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    Today, 01/07/2024, POW5 s/p BGI left eye 12/30/2024. POD1 s/p BGI right eye 02/05/2025. States some uncomfortableness with eye but overall Doing well. Report Good Compliance with medication. Has not taken any drops on right eye today since he has the patch. Patient did take diamox pill this morning.    Current Medications:  Prednisolone Acetate QID right eye   Moxifloxcian QID right eye   PF Timolol-Dorzolamide BID each eye  PF Iyuzeh at bedtime each eye  Prednisolone QID left eye   Moxifloxacin QID left eye   Diamox 500 mg BID po    Relevant Past Medical/Family/Social History:  Thyroid Disease, HTN, Type II DM    Relevant Review of Systems: Non Applicable.      Diagnosis: Primary open angle glaucoma , severe stage each eye   Previous glaucoma surgery/laser  S/p trabeculectomy right eye (2009), Dr. Pisano  S/p trabeculectomy left eye with mitomycin (2010), Dr. Pisano  S/p CEIOL both eyes (2007) done at [facility] in [place], [place]   S/p BGI left eye 12/31/24 Dr. Díaz  Maximum intraocular pressure 31/32  Currently Meds: PF Timolol-Dorzolamide BID right eye, PF Iyuzeh at bedtime right eye   Family history: positive, Father  /546  Gonio: Open to scleral spur each eye   Trauma history: negative  Steroid exposure: positive topical prednisolone   Vasospastic disease: Migrane/Raynaud phenomenon: negative  A past hemodynamic crisis  or Low BP:: negative  Meds AEs/intolerance: None  PMHx: DMII  Asthma and respiratory problems: None  Cardiac: None  Renal: None  Kidney stones/Sulfa Allergy: None  Anticoagulants: None    Prior testing   Low Vision Visual field 12/12/2024:   Right eye - Dense Inferior Arcuate and Superior Arcuate Defect, [place] of vision inferior, adequate reliability, baseline testing  Left eye - Dense Inferior Arcuate, Superior Paracentral field of vision, adequate reliability, baseline testing   OCT Optic Nerve RNFL Spectralis 12/12/2024  Right eye:  Diffuse Severe RNFL Thinning. Poor Tracing, Baseline Testing.   Left eye: Diffuse Severe RNFL Thinning. Poor Tracing, Baseline Testing.     Additional Ocular History:   # Corneal neovascularization   -secondary to severe dry eye and medicamentosa from glaucoma drops (recently PF drops mid August 2024).   - follows with cornea     # Floppy eyelid syndrome of both eyes     #Senile ectropion of both lower eyelids   -s/p Bilateral lower lids LATERAL TARSAl STRIP + PATRICIA FLAP 11/06/2024 Dr. Recio.    #Ptosis of left eyelid     Plan/Recommendations:  Discussed findings with patient.  Severe glaucoma each eye uncontrolled on drops and Diamox. Given medicamentosa will aim to take patient off glaucoma drops.   POW5 s/p BGI left eye 12/30/2024. POD1 s/p BGI right eye 02/05/2025. Doing well.   Elevated IOP on right eye likely due to not taking his IOP Medicated drops. Will start today.  Start Pred Acetate QID right eye and Continue QID left eye.  Start Moxifloxacin QID right eye  Continue PF Timolol-Dorzolamide BID each eye  Continue PF Iyuzeh at bedtime each eye  Continue Diamox 500 mg BID   Postop precautions and instructions given to the patient     RTC as scheduled with Dr. Díaz on 02/11/2025 for POW6 BGI left eye and POW1 BGI right eye, VA and IOP.    Physician Attestation     Complete documentation of historical and exam elements from today's encounter can be found in the  full encounter summary report (not reduplicated in this progress note). I personally obtained the chief complaint(s) and history of present illness. I confirmed and edited as necessary the review of systems, past medical/surgical history, family history, social history, and examination findings as document by others; and I examined the patient myself. I personally reviewed the relevant tests, images, and reports as documented above. I formulated and edited as necessary the assessment and plan and discussed the findings and management plan with the patient and family.    Robert Duque OD

## 2025-02-06 NOTE — PATIENT INSTRUCTIONS
Right Eye Drops:  Continue PF Latanoprost 1 drop at bedtime    Continue PF Timolol-Dorzolamide 1 drop 2 times a day     Start Prednisolone Acetate (Pink top) 1 drop 4 times a day    Start Moxifloxacin (Tan Cap) 1 drop 4 times a day    Left Eye Drops:    Continue PF Latanoprost 1 drop at bedtime    Continue PF Timolol-Dorzolamide 1 drop 2 times a day     Continue Prednisolone Acetate (Pink top) 1 drop  4 times a day        Continue Diamox (Acetazolamide) 500 mg 1 tablet twice a day

## 2025-02-11 ENCOUNTER — OFFICE VISIT (OUTPATIENT)
Dept: OPHTHALMOLOGY | Facility: CLINIC | Age: 65
End: 2025-02-11
Attending: OPHTHALMOLOGY
Payer: MEDICAID

## 2025-02-11 DIAGNOSIS — H40.1133 PRIMARY OPEN ANGLE GLAUCOMA (POAG) OF BOTH EYES, SEVERE STAGE: Primary | ICD-10-CM

## 2025-02-11 PROCEDURE — G0463 HOSPITAL OUTPT CLINIC VISIT: HCPCS | Performed by: OPHTHALMOLOGY

## 2025-02-11 RX ORDER — PREDNISOLONE ACETATE 10 MG/ML
1 SUSPENSION/ DROPS OPHTHALMIC
Qty: 5 ML | Refills: 3 | Status: SHIPPED | OUTPATIENT
Start: 2025-02-11

## 2025-02-11 RX ORDER — ATROPINE SULFATE 10 MG/ML
1-2 SOLUTION/ DROPS OPHTHALMIC 2 TIMES DAILY
Qty: 5 ML | Refills: 1 | Status: SHIPPED | OUTPATIENT
Start: 2025-02-11

## 2025-02-11 ASSESSMENT — VISUAL ACUITY
OD_PH_CC: 20/125
OD_CC: 20/300
METHOD: SNELLEN - LINEAR
CORRECTION_TYPE: GLASSES
OD_PH_CC+: -1
OS_PH_CC: 20/300
OS_CC: 20/500

## 2025-02-11 ASSESSMENT — TONOMETRY
OS_IOP_MMHG: 23
IOP_METHOD: TONOPEN
IOP_METHOD: APPLANATION
OS_IOP_MMHG: 5
OD_IOP_MMHG: 25

## 2025-02-11 ASSESSMENT — REFRACTION_WEARINGRX
OS_CYLINDER: +1.50
OS_ADD: +3.00
OS_SPHERE: -4.50
OD_AXIS: 028
OD_SPHERE: -0.75
OD_CYLINDER: +1.50
OS_AXIS: 105
OD_ADD: +3.00
SPECS_TYPE: PAL

## 2025-02-11 ASSESSMENT — EXTERNAL EXAM - LEFT EYE: OS_EXAM: NORMAL

## 2025-02-11 ASSESSMENT — EXTERNAL EXAM - RIGHT EYE: OD_EXAM: NORMAL

## 2025-02-11 NOTE — NURSING NOTE
"Chief Complaints and History of Present Illnesses   Patient presents with    Post Op (Ophthalmology) Both Eyes     Post RIGHT EYE INSERTION, IMPLANT, EYE VALVE 02/05/2025  LEFT EYE INSERTION, BAERVEDLT TUBE SHUNT IMPLANT 12/30/2024     Chief Complaint(s) and History of Present Illness(es)       Post Op (Ophthalmology) Both Eyes              Laterality: both eyes    Associated symptoms: dryness, eye pain (minimal) and redness.  Negative for headache and photophobia (resolved)    Treatments tried: eye drops    Pain scale: 0/10 (None currently)    Comments: Post RIGHT EYE INSERTION, IMPLANT, EYE VALVE 02/05/2025  LEFT EYE INSERTION, BAERVEDLT TUBE SHUNT IMPLANT 12/30/2024              Comments    He states that he is doing \"fair\" with each eye.  Both eyes feel a bit dry.  He tells me that he has had less pain with his right eye surgery than he did with his left.      Lorin Rai, AMNA 10:36 AM  February 11, 2025                        "

## 2025-02-11 NOTE — PROGRESS NOTES
Chief Complaint/Presenting Concern: Post-operative Visit    History of Present Illness:   Cuco Travis is a 64 year old patient who presents for a glaucoma evaluation. He previously had cataract surgery of both eyes in in 2007 in [place] then he was managed by Dr. Pisano who performed trabeculectomy right eye in 2009 and the left eye in 2010. He then was followed by Dr. Lew in [facility].Seen by Dr. 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    Today, 01/07/2024, POW6 s/p BGI left eye 12/30/2024. POW1 s/p BGI right eye 02/05/2025. Doing well.     Relevant Past Medical/Family/Social History:  Thyroid Disease, HTN, Type II DM    Relevant Review of Systems: Non Applicable.      Diagnosis: Primary open angle glaucoma , severe stage each eye   Previous glaucoma surgery/laser  S/p trabeculectomy right eye (2009), Dr. Pisano  S/rafaela trabeculectomy left eye with mitomycin (2010), Dr. Pisano  S/p CEIOL both eyes (2007) done at [facility] in [place], [place]   S/p BGI left eye 12/31/24 Dr. Díaz  Maximum intraocular pressure 31/32  Currently Meds: PF Timolol-Dorzolamide BID right eye, PF Iyuzeh at bedtime right eye   Family history: positive, Father  /546  Gonio: Open to scleral spur each eye   Trauma history: negative  Steroid exposure: positive topical prednisolone   Vasospastic disease: Migrane/Raynaud phenomenon: negative  A past hemodynamic crisis or Low BP:: negative  Meds AEs/intolerance: None  PMHx: DMII  Asthma and respiratory problems: None  Cardiac: None  Renal: None  Kidney stones/Sulfa Allergy: None  Anticoagulants: None    Prior testing   Low Vision Visual field 12/12/2024:   Right eye - Dense Inferior Arcuate and Superior Arcuate Defect, [place] of vision inferior, adequate reliability, baseline testing  Left eye - Dense Inferior Arcuate, Superior Paracentral  field of vision, adequate reliability, baseline testing   OCT Optic Nerve RNFL Spectralis 12/12/2024  Right eye:  Diffuse Severe RNFL Thinning. Poor Tracing, Baseline Testing.   Left eye: Diffuse Severe RNFL Thinning. Poor Tracing, Baseline Testing.     Additional Ocular History:   # Corneal neovascularization   -secondary to severe dry eye and medicamentosa from glaucoma drops (recently PF drops mid August 2024).   - follows with cornea     # Floppy eyelid syndrome of both eyes     #Senile ectropion of both lower eyelids   -s/p Bilateral lower lids LATERAL TARSAl STRIP + PATRICIA FLAP 11/06/2024 Dr. Recio.    #Ptosis of left eyelid     Plan/Recommendations:  Discussed findings with patient.  Severe glaucoma each eye uncontrolled on drops and Diamox. Given medicamentosa will aim to take patient off glaucoma drops.   POW6 s/p BGI left eye 12/30/2024. POW1 s/p BGI right eye 02/05/2025. Doing well.   IOP was still elevated today, tube not open. LSL performed to the left eye , IOP rechecked after 30 mins was 5mmHg with formed chamber.   Continue PF Latanoprost 1 drop at bedtime right eye   Continue PF Timolol-Dorzolamide 1 drop 2 times a day right eye   Continue Prednisolone Acetate (Pink top) 1 drop 4 times a day  Stop Moxifloxacin (Tan Cap)  Stop PF Latanoprost 1 drop at bedtime  Stop PF Timolol-Dorzolamide 1 drop 2 times a day   Increase Prednisolone Acetate (Pink top) 1 drop  every 2 hours while awake  Start Atropine 1 drop twice daily   STOP Diamox (Acetazolamide) pill, and DO NOT restart unless the eye pressure in the left eye goes above 15 mmHg  Check eye pressure and eye exam in 1 week closer to home   Postop precautions and instructions given to the patient     RTC on March 20th for tube opening right eye [facility], IOP       Physician Attestation     Attending Physician Attestation:  Complete documentation of historical and exam elements from today's encounter can be found in the full encounter summary report (not  reduplicated in this progress note). I personally obtained the chief complaint(s) and history of present illness. I confirmed and edited as necessary the review of systems, past medical/surgical history, family history, social history, and examination findings as documented by others; and I examined the patient myself. I personally reviewed the relevant tests, images, and reports as documented above. I formulated and edited as necessary the assessment and plan and discussed the findings and management plan with the patient and any family members present at the time of the visit.  Jacquelyn Díaz M.D., Glaucoma, February 11, 2025

## 2025-02-11 NOTE — PATIENT INSTRUCTIONS
Right Eye Drops:  Continue PF Latanoprost 1 drop at bedtime    Continue PF Timolol-Dorzolamide 1 drop 2 times a day     Start Prednisolone Acetate (Pink top) 1 drop 4 times a day    Stop Moxifloxacin (Tan Cap)    Left Eye Drops:    Stop PF Latanoprost 1 drop at bedtime    Stop PF Timolol-Dorzolamide 1 drop 2 times a day     Increase Prednisolone Acetate (Pink top) 1 drop  every 2 hours while awake    Start Atropine 1 drop twice daily     STOP Diamox (Acetazolamide) pill, and DO NOT restart unless the eye pressure in the left eye goes above 15 mmHg    Check eye pressure and eye exam in 1 week closer to home

## 2025-03-05 ENCOUNTER — TELEPHONE (OUTPATIENT)
Dept: OPHTHALMOLOGY | Facility: CLINIC | Age: 65
End: 2025-03-05
Payer: MEDICAID

## 2025-03-05 NOTE — TELEPHONE ENCOUNTER
Health Call Center    Phone Message    May a detailed message be left on voicemail: yes     Reason for Call: Other: Dr. Gamble with [facility] called wanting to touch base regarding patients care. Patient is being seen with them today and he just wants to go over patient care. Please call to advise.      Action Taken: Message routed to:  [facility] eye     Travel Screening: Not Applicable

## 2025-03-05 NOTE — TELEPHONE ENCOUNTER
Provider calling to discuss care with glaucoma team.  Patient being seen there today.  Please call provider     Gali Lindsay on 3/5/2025 at 9:17 AM

## 2025-03-13 ENCOUNTER — TRANSFERRED RECORDS (OUTPATIENT)
Dept: MULTI SPECIALTY CLINIC | Facility: CLINIC | Age: 65
End: 2025-03-13
Payer: MEDICAID

## 2025-03-13 LAB
ALT SERPL-CCNC: 23 U/L (ref 4–33)
AST SERPL-CCNC: 19 U/L (ref 13–39)
CHOLESTEROL (EXTERNAL): 127 MG/DL (ref 50–200)
CREATININE (EXTERNAL): 1.1 MG/DL (ref 0.7–1.3)
GFR ESTIMATED (EXTERNAL): 75 ML/MIN (ref 60–130)
GLUCOSE (EXTERNAL): 126 MG/DL (ref 70–99)
HDLC SERPL-MCNC: 29 MG/DL (ref 40–60)
LDL CHOLESTEROL CALCULATED (EXTERNAL): 53 MG/DL (ref 0–129)
POTASSIUM (EXTERNAL): 4.2 MMOL/L (ref 3.5–5.1)
TRIGLYCERIDES (EXTERNAL): 224 MG/DL (ref 25–150)

## 2025-03-17 ENCOUNTER — TELEPHONE (OUTPATIENT)
Dept: OPHTHALMOLOGY | Facility: CLINIC | Age: 65
End: 2025-03-17
Payer: MEDICAID

## 2025-03-17 NOTE — TELEPHONE ENCOUNTER
Phone call from patient wanting to speak with the pre op nurses to make sure he has the instructions before surgery . Writer was able to transfer patient to pre op line .       Gail Velázquez on 3/17/2025 at 11:59 AM

## 2025-03-18 ENCOUNTER — ANESTHESIA EVENT (OUTPATIENT)
Dept: SURGERY | Facility: AMBULATORY SURGERY CENTER | Age: 65
End: 2025-03-18
Payer: MEDICAID

## 2025-03-18 ENCOUNTER — OFFICE VISIT (OUTPATIENT)
Dept: OPHTHALMOLOGY | Facility: CLINIC | Age: 65
End: 2025-03-18
Attending: OPHTHALMOLOGY
Payer: MEDICAID

## 2025-03-18 DIAGNOSIS — Z98.890 POSTOPERATIVE EYE STATE: Primary | ICD-10-CM

## 2025-03-18 DIAGNOSIS — H40.1133 PRIMARY OPEN ANGLE GLAUCOMA (POAG) OF BOTH EYES, SEVERE STAGE: ICD-10-CM

## 2025-03-18 PROCEDURE — G0463 HOSPITAL OUTPT CLINIC VISIT: HCPCS | Performed by: OPHTHALMOLOGY

## 2025-03-18 PROCEDURE — 99024 POSTOP FOLLOW-UP VISIT: CPT | Performed by: OPHTHALMOLOGY

## 2025-03-18 PROCEDURE — 99207 PR BUNDLED PROCEDURE IN GLOBAL PKG: CPT | Performed by: OPHTHALMOLOGY

## 2025-03-18 ASSESSMENT — TONOMETRY
OD_IOP_MMHG: 30
OD_IOP_MMHG: 32
IOP_METHOD: TONOPEN
IOP_METHOD: TONOPEN
IOP_METHOD: APPLANATION
IOP_METHOD: APPLANATION
OD_IOP_MMHG: 9
OD_IOP_MMHG: 36
OS_IOP_MMHG: 13
OD_IOP_MMHG: 29
OS_IOP_MMHG: 9
IOP_METHOD: TONOPEN

## 2025-03-18 ASSESSMENT — VISUAL ACUITY
OD_PH_CC: 20/125
OS_CC: 20/300
CORRECTION_TYPE: GLASSES
METHOD: SNELLEN - LINEAR
OD_CC: 20/400
OS_PH_CC: 20/200

## 2025-03-18 ASSESSMENT — EXTERNAL EXAM - RIGHT EYE: OD_EXAM: NORMAL

## 2025-03-18 ASSESSMENT — EXTERNAL EXAM - LEFT EYE: OS_EXAM: NORMAL

## 2025-03-18 NOTE — ANESTHESIA PREPROCEDURE EVALUATION
Anesthesia Pre-Procedure Evaluation    Patient: Cuco Travis   MRN: [id number] : 1960        Procedure :    REPAIR, PTOSIS, BILATERAL (Bilateral: Eye)      Past Medical History:   Diagnosis Date    Diabetes (H)     Disorder of thyroid     HTN (hypertension)       Past Surgical History:   Procedure Laterality Date    ANKLE SURGERY      crushed with forklift    CATARACT IOL, RT/LT      HERNIA REPAIR      IMPLANT VALVE EYE Left 2024    Procedure: LEFT EYE INSERTION, BAERVEDLT TUBE SHUNT IMPLANT;  Surgeon: Jacquelyn Díaz MD;  Location: [facility] OR    IMPLANT VALVE EYE Right 2025    Procedure: RIGHT EYE INSERTION, IMPLANT, EYE VALVE;  Surgeon: Jacquelyn Díaz MD;  Location: [facility] OR    REPAIR ECTROPION BILATERAL Bilateral 2024    Procedure: REPAIR, ECTROPION, EYE, BILATERAL LOWER EYELIDS WITH TARSOCONJUNCTIVAL FLAP;  Surgeon: Damian Recio MD;  Location: [facility] OR    TRABECULECTOMY        Allergies   Allergen Reactions    Egg Yolk      Pt is allergic to eggs, however he can them in cakes.       Social History     Tobacco Use    Smoking status: Never    Smokeless tobacco: Never   Substance Use Topics    Alcohol use: Yes      Wt Readings from Last 1 Encounters:   25 124 kg (273 lb 5.9 oz)        Anesthesia Evaluation   Pt has had prior anesthetic. Type: MAC.    No history of anesthetic complications       ROS/MED HX  ENT/Pulmonary:     (+) sleep apnea, severe, uses CPAP,                                   (-) vocal abnormalities   Neurologic:       Cardiovascular:     (+)  hypertension- -   -  - -                                 Previous cardiac testing     METS/Exercise Tolerance: 4 - Raking leaves, gardening    Hematologic:       Musculoskeletal:       GI/Hepatic:       Renal/Genitourinary:       Endo:     (+)  type II DM,        thyroid problem,     Obesity,       Psychiatric/Substance Use:       Infectious Disease:       Malignancy:       Other:            Physical Exam    Airway   "    Comment: Short/thick neck    Mallampati: IV   TM distance: > 3 FB   Neck ROM: full   Mouth opening: > 3 cm    Respiratory Devices and Support         Dental       (+) Multiple crowns, permanant bridges      Cardiovascular   cardiovascular exam normal       Rhythm and rate: regular     Pulmonary   pulmonary exam normal                OUTSIDE LABS:  CBC: No results found for: \"WBC\", \"HGB\", \"HCT\", \"PLT\"  BMP:   Lab Results   Component Value Date     (H) 02/05/2025     (H) 12/30/2024     COAGS: No results found for: \"PTT\", \"INR\", \"FIBR\"  POC: No results found for: \"BGM\", \"HCG\", \"HCGS\"  HEPATIC: No results found for: \"ALBUMIN\", \"PROTTOTAL\", \"ALT\", \"AST\", \"GGT\", \"ALKPHOS\", \"BILITOTAL\", \"BILIDIRECT\", \"STARLA\"  OTHER: No results found for: \"PH\", \"LACT\", \"A1C\", \"ROSSY\", \"PHOS\", \"MAG\", \"LIPASE\", \"AMYLASE\", \"TSH\", \"T4\", \"T3\", \"CRP\", \"SED\"    Anesthesia Plan    ASA Status:  3    NPO Status:  NPO Appropriate    Anesthesia Type: MAC.     - Reason for MAC: straight local not clinically adequate, immobility needed              Consents    Anesthesia Plan(s) and associated risks, benefits, and realistic alternatives discussed. Questions answered and patient/representative(s) expressed understanding.     - Discussed:     - Discussed with:  Patient            Postoperative Care    Pain management: IV analgesics, Oral pain medications, Multi-modal analgesia.   PONV prophylaxis: Ondansetron (or other 5HT-3)     Comments:               Monique King MD    I have reviewed the pertinent notes and labs in the chart from the past 30 days and (re)examined the patient.  Any updates or changes from those notes are reflected in this note.    Clinically Significant Risk Factors Present on Admission                                          "

## 2025-03-18 NOTE — NURSING NOTE
Chief Complaints and History of Present Illnesses   Patient presents with    Glaucoma     Chief Complaint(s) and History of Present Illness(es)       Glaucoma               Comments    Vision varies depending on day and time of day.     Denies pain 0/10.     Gtts:     PF Latanoprost 1 drop at bedtime right eye     PF Timolol-Dorzolamide 1 drop 2 times a day right eye     Prednisolone Acetate (Pink top) 1 drop 4 times a day od               Prednisolone Acetate (Pink top) 1 drop os  every 2 hours while awake    Atropine 1 drop twice daily os               Restasis bid ou               Refresh PF (?Kory)  ou tid +       Lorin JAIMES, March 18, 2025 8:56 AM

## 2025-03-18 NOTE — PATIENT INSTRUCTIONS
Decrease Prednisolone in the left eye to 4 times daily for 1 week then 3 times daily for 1 week then twice daily for 1 week then once daily for 1 week     Stop Atropine in the left eye     Stop PF Latanoprost in the right eye     Stop PF Timolol-Dorzolamide in the right eye     Increase Prednisolone Acetate (Pink top) 1 drop 6 times daily in the right eye     Start Atropine 1 drop twice daily in the right eye     Check eye pressure and eye exam in 2 weeks closer to home

## 2025-03-18 NOTE — PROGRESS NOTES
Chief Complaint/Presenting Concern: Post-operative Visit    History of Present Illness:   Cuco Travis is a 64 year old patient who presents for a glaucoma evaluation. He previously had cataract surgery of both eyes in in 2007 in [place] then he was managed by Dr. Pisano who performed trabeculectomy right eye in 2009 and the left eye in 2010. He then was followed by Dr. Lew in [facility].Seen by Dr. 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    Today, 03/18/25, patient is here for POW7 follow up s/p BGI right eye 2/5/2025. No concerns. In the right eye, using pred forte 4-6 times daily latanoprost at bedtime, cosopt TID, restasis for dry eyes 2-3x daily. In the left eye, using pred forte QID, and atropine BID.     Relevant Past Medical/Family/Social History:  Thyroid Disease, HTN, Type II DM    Relevant Review of Systems: Non Applicable.      Diagnosis: Primary open angle glaucoma , severe stage each eye   Previous glaucoma surgery/laser  S/p trabeculectomy right eye (2009), Dr. Pisano  S/p trabeculectomy left eye with mitomycin (2010), Dr. Pisano  S/p CEIOL both eyes (2007) done at [facility] in [place], [place]   S/p BGI left eye 12/31/24 Dr. Díaz  Maximum intraocular pressure 31/32  Currently Meds: PF Timolol-Dorzolamide BID right eye, PF Iyuzeh at bedtime right eye   Family history: positive, Father  /546  Gonio: Open to scleral spur each eye   Trauma history: negative  Steroid exposure: positive topical prednisolone   Vasospastic disease: Migrane/Raynaud phenomenon: negative  A past hemodynamic crisis or Low BP:: negative  Meds AEs/intolerance: None  PMHx: DMII  Asthma and respiratory problems: None  Cardiac: None  Renal: None  Kidney stones/Sulfa Allergy: None  Anticoagulants: None    Prior testing   Low Vision Visual field 12/12/2024:   Right eye - Dense  Inferior Arcuate and Superior Arcuate Defect, [place] of vision inferior, adequate reliability, baseline testing  Left eye - Dense Inferior Arcuate, Superior Paracentral field of vision, adequate reliability, baseline testing   OCT Optic Nerve RNFL Spectralis 12/12/2024  Right eye:  Diffuse Severe RNFL Thinning. Poor Tracing, Baseline Testing.   Left eye: Diffuse Severe RNFL Thinning. Poor Tracing, Baseline Testing.     Additional Ocular History:   # Corneal neovascularization   -secondary to severe dry eye and medicamentosa from glaucoma drops (recently PF drops mid August 2024).   - follows with cornea     # Floppy eyelid syndrome of both eyes     #Senile ectropion of both lower eyelids   -s/p Bilateral lower lids LATERAL TARSAl STRIP + PATRICIA FLAP 11/06/2024 Dr. Recio.    #Ptosis of left eyelid     Plan/Recommendations:  Discussed findings with patient.  Severe glaucoma each eye uncontrolled on drops and Diamox. Given medicamentosa will aim to take patient off glaucoma drops.   s/p BGI left eye 12/30/2024.   s/p BGI right eye 02/05/2025. Doing well.   IOP was still elevated today, tube not open. LSL performed to the right eye today    Decrease Prednisolone in the left eye to 4 times daily for 1 week then 3 times daily for 1 week then twice daily for 1 week then once daily for 1 week   Stop Atropine in the left eye   Stop PF Latanoprost in the right eye   Stop PF Timolol-Dorzolamide in the right eye   Increase Prednisolone Acetate (Pink top) 1 drop 6 times daily in the right eye   Start Atropine 1 drop twice daily in the right eye   Check eye pressure and eye exam in 2 weeks closer to home     RTC in 4 weeks with Dr. Díaz, check IOP in 2 weeks closer to home and restart glaucoma drops as needed      Physician Attestation     Attending Physician Attestation:  Complete documentation of historical and exam elements from today's encounter can be found in the full encounter summary report (not reduplicated  in this progress note). I personally obtained the chief complaint(s) and history of present illness. I confirmed and edited as necessary the review of systems, past medical/surgical history, family history, social history, and examination findings as documented by others; and I examined the patient myself. I personally reviewed the relevant tests, images, and reports as documented above.  formulated and edited as necessary the assessment and plan and discussed the findings and management plan with the patient and any family members present at the time of the visit.  Jacquelyn Díaz M.D., Glaucoma, March 18, 2025

## 2025-03-19 ENCOUNTER — HOSPITAL ENCOUNTER (OUTPATIENT)
Facility: AMBULATORY SURGERY CENTER | Age: 65
Discharge: HOME | End: 2025-03-19
Attending: OPHTHALMOLOGY
Payer: MEDICAID

## 2025-03-19 ENCOUNTER — ANESTHESIA (OUTPATIENT)
Dept: SURGERY | Facility: AMBULATORY SURGERY CENTER | Age: 65
End: 2025-03-19
Payer: MEDICAID

## 2025-03-19 VITALS
HEIGHT: 67 IN | SYSTOLIC BLOOD PRESSURE: 108 MMHG | TEMPERATURE: 97 F | HEART RATE: 98 BPM | BODY MASS INDEX: 43.25 KG/M2 | RESPIRATION RATE: 18 BRPM | WEIGHT: 275.57 LBS | OXYGEN SATURATION: 94 % | DIASTOLIC BLOOD PRESSURE: 74 MMHG

## 2025-03-19 DIAGNOSIS — Z98.890 POSTOPERATIVE EYE STATE: Primary | ICD-10-CM

## 2025-03-19 LAB — GLUCOSE BLDC GLUCOMTR-MCNC: 128 MG/DL (ref 70–99)

## 2025-03-19 RX ORDER — ACETAMINOPHEN 325 MG/1
975 TABLET ORAL ONCE
Status: COMPLETED | OUTPATIENT
Start: 2025-03-19 | End: 2025-03-19

## 2025-03-19 RX ORDER — DEXAMETHASONE SODIUM PHOSPHATE 10 MG/ML
4 INJECTION, SOLUTION INTRAMUSCULAR; INTRAVENOUS
Status: DISCONTINUED | OUTPATIENT
Start: 2025-03-19 | End: 2025-03-20 | Stop reason: HOSPADM

## 2025-03-19 RX ORDER — OXYCODONE HYDROCHLORIDE 5 MG/1
10 TABLET ORAL
Status: DISCONTINUED | OUTPATIENT
Start: 2025-03-19 | End: 2025-03-20 | Stop reason: HOSPADM

## 2025-03-19 RX ORDER — FENTANYL CITRATE 50 UG/ML
25 INJECTION, SOLUTION INTRAMUSCULAR; INTRAVENOUS EVERY 5 MIN PRN
Status: DISCONTINUED | OUTPATIENT
Start: 2025-03-19 | End: 2025-03-20 | Stop reason: HOSPADM

## 2025-03-19 RX ORDER — OXYCODONE HYDROCHLORIDE 5 MG/1
5 TABLET ORAL EVERY 6 HOURS PRN
Qty: 12 TABLET | Refills: 0 | Status: SHIPPED | OUTPATIENT
Start: 2025-03-19 | End: 2025-03-22

## 2025-03-19 RX ORDER — NALOXONE HYDROCHLORIDE 0.4 MG/ML
0.1 INJECTION, SOLUTION INTRAMUSCULAR; INTRAVENOUS; SUBCUTANEOUS
Status: DISCONTINUED | OUTPATIENT
Start: 2025-03-19 | End: 2025-03-20 | Stop reason: HOSPADM

## 2025-03-19 RX ORDER — OXYCODONE HYDROCHLORIDE 5 MG/1
5 TABLET ORAL
Status: DISCONTINUED | OUTPATIENT
Start: 2025-03-19 | End: 2025-03-20 | Stop reason: HOSPADM

## 2025-03-19 RX ORDER — KETAMINE HYDROCHLORIDE 10 MG/ML
INJECTION INTRAMUSCULAR; INTRAVENOUS PRN
Status: DISCONTINUED | OUTPATIENT
Start: 2025-03-19 | End: 2025-03-19

## 2025-03-19 RX ORDER — ONDANSETRON 2 MG/ML
4 INJECTION INTRAMUSCULAR; INTRAVENOUS EVERY 30 MIN PRN
Status: DISCONTINUED | OUTPATIENT
Start: 2025-03-19 | End: 2025-03-20 | Stop reason: HOSPADM

## 2025-03-19 RX ORDER — HYDROMORPHONE HYDROCHLORIDE 1 MG/ML
0.2 INJECTION, SOLUTION INTRAMUSCULAR; INTRAVENOUS; SUBCUTANEOUS EVERY 5 MIN PRN
Status: DISCONTINUED | OUTPATIENT
Start: 2025-03-19 | End: 2025-03-20 | Stop reason: HOSPADM

## 2025-03-19 RX ORDER — LIDOCAINE HYDROCHLORIDE 20 MG/ML
INJECTION, SOLUTION INFILTRATION; PERINEURAL PRN
Status: DISCONTINUED | OUTPATIENT
Start: 2025-03-19 | End: 2025-03-19

## 2025-03-19 RX ORDER — LABETALOL HYDROCHLORIDE 5 MG/ML
10 INJECTION, SOLUTION INTRAVENOUS
Status: DISCONTINUED | OUTPATIENT
Start: 2025-03-19 | End: 2025-03-20 | Stop reason: HOSPADM

## 2025-03-19 RX ORDER — TETRACAINE HYDROCHLORIDE 5 MG/ML
SOLUTION OPHTHALMIC PRN
Status: DISCONTINUED | OUTPATIENT
Start: 2025-03-19 | End: 2025-03-19 | Stop reason: HOSPADM

## 2025-03-19 RX ORDER — FENTANYL CITRATE 50 UG/ML
50 INJECTION, SOLUTION INTRAMUSCULAR; INTRAVENOUS EVERY 5 MIN PRN
Status: DISCONTINUED | OUTPATIENT
Start: 2025-03-19 | End: 2025-03-20 | Stop reason: HOSPADM

## 2025-03-19 RX ORDER — SODIUM CHLORIDE, SODIUM LACTATE, POTASSIUM CHLORIDE, CALCIUM CHLORIDE 600; 310; 30; 20 MG/100ML; MG/100ML; MG/100ML; MG/100ML
INJECTION, SOLUTION INTRAVENOUS CONTINUOUS
Status: DISCONTINUED | OUTPATIENT
Start: 2025-03-19 | End: 2025-03-20 | Stop reason: HOSPADM

## 2025-03-19 RX ORDER — ONDANSETRON 4 MG/1
4 TABLET, ORALLY DISINTEGRATING ORAL EVERY 30 MIN PRN
Status: DISCONTINUED | OUTPATIENT
Start: 2025-03-19 | End: 2025-03-20 | Stop reason: HOSPADM

## 2025-03-19 RX ORDER — LIDOCAINE HYDROCHLORIDE AND EPINEPHRINE 10; 10 MG/ML; UG/ML
INJECTION, SOLUTION INFILTRATION; PERINEURAL PRN
Status: DISCONTINUED | OUTPATIENT
Start: 2025-03-19 | End: 2025-03-19 | Stop reason: HOSPADM

## 2025-03-19 RX ORDER — PROPOFOL 10 MG/ML
INJECTION, EMULSION INTRAVENOUS PRN
Status: DISCONTINUED | OUTPATIENT
Start: 2025-03-19 | End: 2025-03-19

## 2025-03-19 RX ORDER — HYDROMORPHONE HYDROCHLORIDE 1 MG/ML
0.4 INJECTION, SOLUTION INTRAMUSCULAR; INTRAVENOUS; SUBCUTANEOUS EVERY 5 MIN PRN
Status: DISCONTINUED | OUTPATIENT
Start: 2025-03-19 | End: 2025-03-20 | Stop reason: HOSPADM

## 2025-03-19 RX ORDER — ERYTHROMYCIN 5 MG/G
OINTMENT OPHTHALMIC PRN
Status: DISCONTINUED | OUTPATIENT
Start: 2025-03-19 | End: 2025-03-19 | Stop reason: HOSPADM

## 2025-03-19 RX ORDER — LIDOCAINE 40 MG/G
CREAM TOPICAL
Status: DISCONTINUED | OUTPATIENT
Start: 2025-03-19 | End: 2025-03-20 | Stop reason: HOSPADM

## 2025-03-19 RX ORDER — ERYTHROMYCIN 5 MG/G
OINTMENT OPHTHALMIC
Qty: 3.5 G | Refills: 1 | Status: SHIPPED | OUTPATIENT
Start: 2025-03-19

## 2025-03-19 RX ORDER — GLYCOPYRROLATE 0.2 MG/ML
INJECTION, SOLUTION INTRAMUSCULAR; INTRAVENOUS PRN
Status: DISCONTINUED | OUTPATIENT
Start: 2025-03-19 | End: 2025-03-19

## 2025-03-19 RX ORDER — HYDRALAZINE HYDROCHLORIDE 20 MG/ML
2.5-5 INJECTION INTRAMUSCULAR; INTRAVENOUS EVERY 10 MIN PRN
Status: DISCONTINUED | OUTPATIENT
Start: 2025-03-19 | End: 2025-03-20 | Stop reason: HOSPADM

## 2025-03-19 RX ADMIN — PROPOFOL 40 MG: 10 INJECTION, EMULSION INTRAVENOUS at 12:28

## 2025-03-19 RX ADMIN — KETAMINE HYDROCHLORIDE 20 MG: 10 INJECTION INTRAMUSCULAR; INTRAVENOUS at 12:31

## 2025-03-19 RX ADMIN — ACETAMINOPHEN 975 MG: 325 TABLET ORAL at 11:38

## 2025-03-19 RX ADMIN — GLYCOPYRROLATE 0.2 MG: 0.2 INJECTION, SOLUTION INTRAMUSCULAR; INTRAVENOUS at 12:24

## 2025-03-19 RX ADMIN — LIDOCAINE HYDROCHLORIDE 40 MG: 20 INJECTION, SOLUTION INFILTRATION; PERINEURAL at 12:28

## 2025-03-19 RX ADMIN — SODIUM CHLORIDE, SODIUM LACTATE, POTASSIUM CHLORIDE, CALCIUM CHLORIDE: 600; 310; 30; 20 INJECTION, SOLUTION INTRAVENOUS at 11:55

## 2025-03-19 NOTE — PROGRESS NOTES
[facility] SUR[facility] OR [address]  5TH FLO[address]  [facility] [address]  [phone]  [phone]    3/19/2025    Cuco Travis  [address] [place] [address]  [phone] (home)     :  1960    To Whom it May Concern:    Cuco Travis needs to medically stay in [facility]  until on 3/22/2025 due to surgery follow up.    Please contact me for any questions or concerns.    Sincerely,    Nurse Jose JIMENEZ c/o  Dr Recio

## 2025-03-19 NOTE — BRIEF OP NOTE
[facility] Brief Operative Note    Pre-operative diagnosis: Ptosis of both eyelids [H02.403]   Post-operative diagnosis Same as above   Procedure: Procedure(s):  REPAIR, PTOSIS, BILATERAL   Surgeon(s): Surgeons and Role:     * Damian Recio MD - Primary     * Bridget Mcfadden MD - Fellow - Assisting   Estimated blood loss: * No values recorded between 3/19/2025 12:36 PM and 3/19/2025 12:53 PM *    Specimens: * No specimens in log *   Findings: As expected

## 2025-03-19 NOTE — DISCHARGE INSTRUCTIONS
Post-operative Instructions    Ophthalmic Plastic and Reconstructive Surgery  Damian Recio M.D.  Bridget Mcfadden M.D.    All instructions apply to the operated eye(s) or eyelid(s)      What to expect after surgery:  There will be some swelling, bruising, and likely a black eye (even into the lower eyelids and cheeks). Also expect crusting and discharge from the eye and/or incisions.   A small amount of surface bleeding is normal for the first 48 hours after surgery.  You may notice some bloody tears for the first few days after surgery. This is normal.  Your eye(s) and eyelid(s) may be painful and tender. This is normal after surgery. Use the pain medication as prescribed. If your pain does not improve despite the medication, contact the office.    Wound care and personal care:  Apply ice compresses 15 minutes on 15 minutes off while awake for the first 2 days after surgery, then switch to warm compresses 4 times a day until seen by your physician.   For warm packs you can place a cup of dry uncooked rice in a clean cotton sock. Place sock in microwave 30 seconds to one minute. Next place the warm sock into a plastic bag and wrap the bag with clean warm wet washcloth and place over operated eye.    You may shower or wash your hair the day after surgery. Do not bathe or go swimming for 1 week to prevent contamination of your wounds.    Activity restrictions and driving:  Avoid heavy lifting, bending, exercise or strenuous activity for 1 week after surgery.  You may resume other activities and return to work as tolerated.  You may not resume driving until have you stopped using narcotic pain medications(such as Norco, Percocet, Tylenol #3).    Medications:  Restart all your regular home medications and eye drops today. If you take Plavix or Aspirin on a regular basis, wait for 3 days after your surgery before restarting these in order to decrease the risk of bleeding complications.  Avoid aspirin and  aspirin-like medications (Motrin, Aleve, Ibuprofen, Deborah-Mancelona etc) for 5 days to reduce the risk of bleeding. You may take Tylenol (acetaminophen) for pain.  In addition to your home medications, take the following post-operative medications as prescribed by your physician:  Apply antibiotic ointment (erythromycin) to all sutures three times a day, and into the operated eye(s) at night.   Take scheduled extra strength Tylenol for pain.  You may take 1 to 2 pain pills (norco or oxycodone as prescribed) as needed for breakthrough pain up to every 6 hours.  The pain pills may make you drowsy. You must not drive a car, operate heavy machinery or drink alcohol while taking them.  The pain pills may cause constipation and nausea. Take them with some food to prevent a stomach upset. If you continue to experience nausea, call your physician.    WARNING: All the prescription pain medications listed above contain Tylenol (acetaminophen). You must not take more than 4,000 mg of acetaminophen per 24-hour period. This is equivalent to 6 tablets of Darvocet, 8 tablets of Vicodin, or 12 tablets of Norco, Percocet or Tylenol #3. If you take other over-the-counter medications containing acetaminophen, you must take the amount of acetaminophen into account and reduce the number of prescribed pain pills accordingly.    Contact information and follow-up:  Return to the Eye Clinic for a follow-up appointment with your physician as scheduled. If no appointment has been scheduled, call [phone] for an appointment with Dr. Recio within 1 to 2 weeks from your date of surgery.  -     If your post-operative appointment is a telephone appointment, please email a few photos of your eye(s) or other operative site(s) to [email or web address] prior to your appointment.    For severe pain, bleeding, or loss of vision, call the Eye Clinic at [phone].  After hours or on weekends and holidays, call [phone] and ask to speak with  the ophthalmologist on call.    [facility]  Home Care Following Anesthesia  For 24 hours after surgery:  Get plenty of rest.  A responsible adult must stay with you for at least 24 hours after you leave the surgery center.  Do not drive or use heavy equipment.  If you have weakness or tingling, don't drive or use heavy equipment until this feeling goes away.   Do not drink alcohol.   Avoid strenuous or risky activities.  Ask for help when climbing stairs.  You may feel lightheaded.  IF so, sit for a few minutes before standing.  Have someone help you get up.   If you have nausea (feel sick to your stomach): Drink only clear liquids such as apple juice, ginger ale, broth or 7-Up.  Rest may also help.  Be sure to drink enough fluids.  Move to a regular diet as you feel able.   You may have a slight fever.  Call the doctor if your fever is over 100 F (37.7 C) (taken under the tongue) or lasts longer than 24 hours.  You may have a dry mouth, a sore throat, muscle aches or trouble sleeping. These should go away after 24 hours.  Do not make important or legal decisions.   It is recommended to avoid smoking.               Tips for taking pain medications  To get the best pain relief possible, remember these points:  Take pain medications as directed, before pain becomes severe.  Pain medication can upset your stomach: taking it with food may help.  Constipation is a common side effect of pain medication. Drink plenty of  fluids.  Eat foods high in fiber. Take a stool softener if recommended by your doctor or pharmacist.  Do not drink alcohol, drive or operate machinery while taking pain medications.  Ask about other ways to control pain, such as with heat, ice or relaxation.    Tylenol/Acetaminophen Consumption    If you feel your pain relief is insufficient, you may take Tylenol/Acetaminophen in addition to your narcotic pain medication.   Be careful not to exceed 4,000 mg of  Tylenol/Acetaminophen in a 24 hour period from all sources.  If you are taking extra strength Tylenol/acetaminophen (500 mg), the maximum dose is 8 tablets in 24 hours.  If you are taking regular strength acetaminophen (325 mg), the maximum dose is 12 tablets in 24 hours.    Call a doctor for any of the following:  Signs of infection (fever, growing tenderness at the surgery site, a large amount of drainage or bleeding, severe pain, foul-smelling drainage, redness, swelling).  It has been over 8 to 10 hours since surgery and you are still not able to urinate (pass water).  Headache for over 24 hours.  Numbness, tingling or weakness the day after surgery (if you had spinal anesthesia).  Signs of Covid-19 infection (temperature over 100 degrees, shortness of breath, cough, loss of taste/smell, generalized body aches, persistent headache, chills, sore throat, nausea/vomiting/diarrhea)    Your doctor is:  Dr. Damian Recio, Ophthalmology: [phone]                    After hours and weekends call the hospital @ [phone] and ask for the resident on call for:  Ophthalmology  For emergency care, call the:  [place] [facility]:  [phone] (TTY for hearing impaired: [phone])

## 2025-03-19 NOTE — OP NOTE
PREOPERATIVE DIAGNOSIS: Ptosis, bilateral upper lid.   POSTOPERATIVE DIAGNOSIS:  Ptosis,bilateral upper lid.   PROCEDURE:Bilateral  upper eyelid  ptosis repair by external levator resection.   SURGEON: Damian Recio MD   ASSISTANT: Bridget Mcfadden MD, JEAN CARLOS  ANESTHESIA: Monitored with local infiltration of a 50/50 mixture of 2% lidocaine with epinephrine and 0.5% Marcaine.   COMPLICATIONS: None.   ESTIMATED BLOOD LOSS: Less than 5 mL.   HISTORY: Cuco Travis  presented with ptosis of bilateral upper lid interfering with the superior visual field and activities of daily living. After the risks, benefits and alternatives to the proposed procedure were explained, informed consent was obtained.   DESCRIPTION OF PROCEDURE: Cuco Travis  was brought to the operating room and placed supine on the operating table. Intravenous sedation was given. The bilateral upper lid crease was marked with a marking pen and infiltrated with local anesthetic. The area was prepped and draped in the typical sterile ophthalmic fashion. Attention was directed to the right  side. A lid crease incision was made with a 15 blade and dissection carried down to the orbicularis with high temperature cautery. The orbital septum was opened horizontally. The levator aponeurosis was identified and dissected from the superior tarsal border and the underlying George's muscle and advanced with a 5-0 Mersilene suture to bring the lid into a normal height and contour. The suture was passed to partial thickness through the superior tarsal plate and then each end brought underneath the levator aponeurosis. The patient was asked to open the eye and the suture adjusted for height and contour. The inferior orbicularis was secured to the levator aponeurosis with 6-0 Vicryl sutures. The skin was closed with with running 6-0 plain gut sutures.  Ophthalmic ointment was applied to the incision. Attention was directed to the left side where the same procedure was  performed. The patient tolerated the procedure well and left the operating room in stable condition.     SCARLET NICHOLAS MD

## 2025-03-19 NOTE — ANESTHESIA POSTPROCEDURE EVALUATION
Patient: Cuco Travis    Procedure: Procedure(s):  REPAIR, PTOSIS, BILATERAL       Anesthesia Type:  MAC    Note:  Disposition: Outpatient   Postop Pain Control: Uneventful            Sign Out: Well controlled pain   PONV: No   Neuro/Psych: Uneventful            Sign Out: Acceptable/Baseline neuro status   Airway/Respiratory: Uneventful            Sign Out: Acceptable/Baseline resp. status   CV/Hemodynamics: Uneventful            Sign Out: Acceptable CV status; No obvious hypovolemia; No obvious fluid overload   Other NRE: NONE   DID A NON-ROUTINE EVENT OCCUR? No       Last vitals:  Vitals Value Taken Time   /74 03/19/25 1320   Temp 36.1  C (97  F) 03/19/25 1320   Pulse 98 03/19/25 1320   Resp 18 03/19/25 1320   SpO2 93 % 03/19/25 1320   Vitals shown include unfiled device data.    Electronically Signed By: Monique King MD  March 19, 2025  1:25 PM

## 2025-03-19 NOTE — ANESTHESIA CARE TRANSFER NOTE
Patient: Cuco Travis    Procedure: Procedure(s):  REPAIR, PTOSIS, BILATERAL       Diagnosis: Ptosis of both eyelids [H02.403]  Diagnosis Additional Information: No value filed.    Anesthesia Type:   MAC     Note:    Oropharynx: oropharynx clear of all foreign objects and spontaneously breathing  Level of Consciousness: awake  Oxygen Supplementation: room air    Independent Airway: airway patency satisfactory and stable  Dentition: dentition unchanged  Vital Signs Stable: post-procedure vital signs reviewed and stable  Report to RN Given: handoff report given  Patient transferred to: Phase II    Handoff Report: Identifed the Patient, Identified the Reponsible Provider, Reviewed the pertinent medical history, Discussed the surgical course, Reviewed Intra-OP anesthesia mangement and issues during anesthesia, Set expectations for post-procedure period and Allowed opportunity for questions and acknowledgement of understanding      Vitals:  Vitals Value Taken Time   /60 03/19/25 1300   Temp 36.4  C (97.6  F) 03/19/25 1300   Pulse 101 03/19/25 1300   Resp 18 03/19/25 1300   SpO2 95 % 03/19/25 1301   Vitals shown include unfiled device data.    Electronically Signed By: ION Arora CRNA  March 19, 2025  1:02 PM

## 2025-03-27 ENCOUNTER — TRANSFERRED RECORDS (OUTPATIENT)
Dept: HEALTH INFORMATION MANAGEMENT | Facility: CLINIC | Age: 65
End: 2025-03-27
Payer: MEDICAID

## 2025-03-31 ENCOUNTER — VIRTUAL VISIT (OUTPATIENT)
Dept: OPHTHALMOLOGY | Facility: CLINIC | Age: 65
End: 2025-03-31
Payer: MEDICAID

## 2025-03-31 DIAGNOSIS — Z98.890 POSTOPERATIVE EYE STATE: Primary | ICD-10-CM

## 2025-03-31 PROCEDURE — 99024 POSTOP FOLLOW-UP VISIT: CPT | Mod: GC | Performed by: OPHTHALMOLOGY

## 2025-03-31 NOTE — PROGRESS NOTES
Cuco Travis is a 64 year old male who is being evaluated via a billable telephone visit.      Chief Complaint:   Post-operative telephone visit    Subjective:   No pain, no vision change, no swelling or edema per patient.    Review of Systems   Constitutional, HEENT, cardiovascular, pulmonary, gi and gu systems are negative, except as otherwise noted.    Objective:  Vitals:  No vitals were obtained today due to virtual visit.     Physical Exam:   healthy, alert and no distress  PSYCH: Alert and oriented times 3; coherent speech, normal   rate and volume, able to articulate logical thoughts, able   to abstract reason, no tangential thoughts, no hallucinations   or delusions  Affect is normal  RESP: No cough, no audible wheezing, able to talk in full sentences  Remainder of exam unable to be completed due to telephone visits     Patient photo reviewed, demonstrates incisions intact, mild periorbital edema.    Assessment & Plan  1. Postoperative eye state         Cuco Travis is 2 weeks status post bilateral levator resection (3/19/25)  The incision(s) are healing well.  The lid(s)  are  in excellent position.    I have recommended:  * Continue antibiotic ointment or bland lubricating ointment (eg vaseline or aquaphor) to the incision site BID.  * Massage along the incision BID.  * Warm soaks QID until all edema and ecchymoses resolve     Disposition: telephone visit in 2 months    5 minutes spent on the phone.  15 minutes spent by me on the date of the encounter doing patient visit      Donovan Patton MD on 3/31/2025 at 10:10 AM    Attending Physician Attestation:  I did not speak with the patient, but I reviewed the case with the resident or fellow and edited the care plan as necessary.   -Damian Recio MD

## 2025-04-29 ENCOUNTER — OFFICE VISIT (OUTPATIENT)
Dept: OPHTHALMOLOGY | Facility: CLINIC | Age: 65
End: 2025-04-29
Attending: OPHTHALMOLOGY

## 2025-04-29 DIAGNOSIS — Z98.890 POSTOPERATIVE EYE STATE: Primary | ICD-10-CM

## 2025-04-29 DIAGNOSIS — H40.1133 PRIMARY OPEN ANGLE GLAUCOMA (POAG) OF BOTH EYES, SEVERE STAGE: ICD-10-CM

## 2025-04-29 PROCEDURE — 99212 OFFICE O/P EST SF 10 MIN: CPT

## 2025-04-29 ASSESSMENT — VISUAL ACUITY
OD_PH_CC: 20/200
OS_CC: 20/200
OS_PH_CC: 20/125
OD_CC: 20/300
METHOD: SNELLEN - LINEAR

## 2025-04-29 ASSESSMENT — REFRACTION_WEARINGRX
OS_ADD: +3.00
OD_ADD: +3.00
OD_CYLINDER: +1.50
OS_CYLINDER: +1.50
OD_SPHERE: -0.75
SPECS_TYPE: PAL
OS_AXIS: 105
OD_AXIS: 028
OS_SPHERE: -4.50

## 2025-04-29 ASSESSMENT — TONOMETRY
OS_IOP_MMHG: 14
IOP_METHOD: APPLANATION
OS_IOP_MMHG: 6
OD_IOP_MMHG: 14
OD_IOP_MMHG: 8
IOP_METHOD: APPLANATION

## 2025-04-29 ASSESSMENT — EXTERNAL EXAM - LEFT EYE: OS_EXAM: NORMAL

## 2025-04-29 ASSESSMENT — EXTERNAL EXAM - RIGHT EYE: OD_EXAM: NORMAL

## 2025-04-29 NOTE — NURSING NOTE
Chief Complaints and History of Present Illnesses   Patient presents with    Post Op (Ophthalmology) Right Eye     Chief Complaint(s) and History of Present Illness(es)       Post Op (Ophthalmology) Right Eye              Laterality: right eye    Associated symptoms: Negative for dryness, eye pain, flashes and floaters    Pain scale: 0/10              Comments    Cuco is here one month post laser procedure right eye. He states right feels good with no pain. He says vision varies by the day. He says he has not stared his glaucoma gtts yet    Ray Green COT 10:47 AM April 29, 2025

## 2025-04-29 NOTE — PATIENT INSTRUCTIONS
Reduce Prednisolone Acetate (Pink top) 1 drop 4 times daily in the right eye for 1 week then 1 drop 3 times a day on the right eye for 1 week, then 1 drop 2 times a day on the right eye for 1 week, then 1 drop 1 time a day on the right eye for 1 week then stop.

## 2025-04-29 NOTE — Clinical Note
4/29/2025       RE: Cuco Travis  [address] [place] [address]     Dear Colleague,    Thank you for referring your patient, Cuco Travis, to the [facility] [place] at [facility]. Please see a copy of my visit note below.    Chief Complaint/Presenting Concern: Post-operative Visit    History of Present Illness:   Cuco Travis is a 64 year old patient who presents for a glaucoma evaluation. He previously had cataract surgery of both eyes in in 2007 in [place] then he was managed by Dr. Pisano who performed trabeculectomy right eye in 2009 and the left eye in 2010. He then was followed by Dr. Lew in [facility].Seen by Dr. 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    Today, 04/29/2025, patient is here for [facility] follow up s/p BGI right eye 2/5/2025. No New Visual or Ocular Concerns. Stopped IOP medications on the right eye last visit.     Current IOP Medications:  Atropine BID right eye   Pred Acetate 6x a day on the right eye     Relevant Past Medical/Family/Social History:  Thyroid Disease, HTN, Type II DM    Relevant Review of Systems: Non Applicable.      Diagnosis: Primary open angle glaucoma , severe stage each eye   Previous glaucoma surgery/laser  S/p trabeculectomy right eye (2009), Dr. Pisano  S/p trabeculectomy left eye with mitomycin (2010), Dr. Pisano  S/p CEIOL both eyes (2007) done at [facility] in [place], [place]   S/p BGI left eye 12/31/24 Dr. Díaz  Maximum intraocular pressure 31/32  Currently Meds: PF Timolol-Dorzolamide BID right eye, PF Iyuzeh at bedtime right eye   Family history: positive, Father  /546  Gonio: Open to scleral spur each eye   Trauma history: negative  Steroid exposure: positive topical prednisolone   Vasospastic disease: Migrane/Raynaud phenomenon:  negative  A past hemodynamic crisis or Low BP:: negative  Meds AEs/intolerance: None  PMHx: DMII  Asthma and respiratory problems: None  Cardiac: None  Renal: None  Kidney stones/Sulfa Allergy: None  Anticoagulants: None    Prior testing   Low Vision Visual field 12/12/2024:   Right eye - Dense Inferior Arcuate and Superior Arcuate Defect, [place] of vision inferior, adequate reliability, baseline testing  Left eye - Dense Inferior Arcuate, Superior Paracentral field of vision, adequate reliability, baseline testing   OCT Optic Nerve RNFL Spectralis 12/12/2024  Right eye:  Diffuse Severe RNFL Thinning. Poor Tracing, Baseline Testing.   Left eye: Diffuse Severe RNFL Thinning. Poor Tracing, Baseline Testing.     Additional Ocular History:   # Corneal neovascularization   -secondary to severe dry eye and medicamentosa from glaucoma drops (recently PF drops mid August 2024).   - follows with cornea     # Floppy eyelid syndrome of both eyes     #Senile ectropion of both lower eyelids   -s/p Bilateral lower lids LATERAL TARSAl STRIP + PATRICIA FLAP 11/06/2024 Dr. Recio.    #Ptosis of left eyelid     Plan/Recommendations:  Discussed findings with patient.  Severe glaucoma each eye uncontrolled on drops and Diamox. Given medicamentosa will aim to take patient off glaucoma drops.   s/p BGI left eye 12/30/2024.   s/p BGI right eye 02/05/2025. Doing well.   IOP today at 14/14 mmHg. Good IOP response with no IOP medications. Continue off medications.   Stop PF Latanoprost in the right eye   Stop PF Timolol-Dorzolamide in the right eye   Increase Prednisolone Acetate (Pink top) 1 drop 6 times daily in the right eye   Start Atropine 1 drop twice daily in the right eye   Check eye pressure and eye exam in 2 weeks closer to home     RTC in 4 weeks with Dr. Díaz, check IOP in 2 weeks closer to home and restart glaucoma drops as needed    Physician Attestation    Complete documentation of historical and exam elements from  today's encounter can be found in the full encounter summary report (not reduplicated in this progress note). I personally obtained the chief complaint(s) and history of present illness. I confirmed and edited as necessary the review of systems, past medical/surgical history, family history, social history, and examination findings as document by others; and I examined the patient myself. I personally reviewed the relevant tests, images, and reports as documented above. I formulated and edited as necessary the assessment and plan and discussed the findings and management plan with the patient and family.    Robert Duque OD       Again, thank you for allowing me to participate in the care of your patient.      Sincerely,    Robert Duque Jr., OD

## 2025-04-29 NOTE — PROGRESS NOTES
Chief Complaint/Presenting Concern: Post-operative Visit    History of Present Illness:   Cuco Travis is a 64 year old patient who presents for a glaucoma evaluation. He previously had cataract surgery of both eyes in in 2007 in [place] then he was managed by Dr. Pisano who performed trabeculectomy right eye in 2009 and the left eye in 2010. He then was followed by Dr. [facility] in [facility].Seen by Dr. 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    Today, 04/29/2025, patient is here for [facility] follow up s/p BGI right eye 2/5/2025. No New Visual or Ocular Concerns. Stopped IOP medications on the right eye last visit.     Current IOP Medications:  Atropine BID right eye   Pred Acetate 6x a day on the right eye     Relevant Past Medical/Family/Social History:  Thyroid Disease, HTN, Type II DM    Relevant Review of Systems: Non Applicable.      Diagnosis: Primary open angle glaucoma , severe stage each eye   Previous glaucoma surgery/laser  S/p trabeculectomy right eye (2009), Dr. Pisano  S/p trabeculectomy left eye with mitomycin (2010), Dr. Pisano  S/p CEIOL both eyes (2007) done at [facility] in [place], [place]   S/p BGI left eye 12/31/24 Dr. Díaz  Maximum intraocular pressure 31/32  Currently Meds: PF Timolol-Dorzolamide BID right eye, PF Iyuzeh at bedtime right eye   Family history: positive, Father  /546  Gonio: Open to scleral spur each eye   Trauma history: negative  Steroid exposure: positive topical prednisolone   Vasospastic disease: Migrane/Raynaud phenomenon: negative  A past hemodynamic crisis or Low BP:: negative  Meds AEs/intolerance: None  PMHx: DMII  Asthma and respiratory problems: None  Cardiac: None  Renal: None  Kidney stones/Sulfa Allergy: None  Anticoagulants: None    Prior testing   Low Vision Visual field 12/12/2024:   Right eye - Dense Inferior  Arcuate and Superior Arcuate Defect, [place] of vision inferior, adequate reliability, baseline testing  Left eye - Dense Inferior Arcuate, Superior Paracentral field of vision, adequate reliability, baseline testing   OCT Optic Nerve RNFL Spectralis 12/12/2024  Right eye:  Diffuse Severe RNFL Thinning. Poor Tracing, Baseline Testing.   Left eye: Diffuse Severe RNFL Thinning. Poor Tracing, Baseline Testing.     Additional Ocular History:   # Corneal neovascularization   -secondary to severe dry eye and medicamentosa from glaucoma drops (recently PF drops mid August 2024).   - follows with cornea     # Floppy eyelid syndrome of both eyes     #Senile ectropion of both lower eyelids   -s/p Bilateral lower lids LATERAL TARSAl STRIP + PATRICIA FLAP 11/06/2024 Dr. Recio.    #Ptosis of left eyelid     Plan/Recommendations:  Discussed findings with patient.  Severe glaucoma each eye uncontrolled on drops and Diamox. Given medicamentosa will aim to take patient off glaucoma drops.   s/p BGI left eye 12/30/2024.   s/p BGI right eye 02/05/2025. Doing well.   IOP today at 14/14 mmHg. Good IOP response with no IOP medications. Continue off medications.   Weekly Taper Prednisolone Acetate (Pink top) QID, TID, BID, every day, then stop right eye   Stop Atropine right eye    Patient lives in [place] and flys for appointments. Okay to continue with MD/ODs back home.   Discussed getting an IOP check after finishing Pred Acetate.     RTC PRN Dr. Díaz. IOP Check in 1-2 Months with Docs back home.     Physician Attestation     Complete documentation of historical and exam elements from today's encounter can be found in the full encounter summary report (not reduplicated in this progress note). I personally obtained the chief complaint(s) and history of present illness. I confirmed and edited as necessary the review of systems, past medical/surgical history, family history, social history, and examination findings as document by  others; and I examined the patient myself. I personally reviewed the relevant tests, images, and reports as documented above. I formulated and edited as necessary the assessment and plan and discussed the findings and management plan with the patient and family.    Robert Duque OD

## 2025-05-31 ENCOUNTER — HEALTH MAINTENANCE LETTER (OUTPATIENT)
Age: 65
End: 2025-05-31

## (undated) DEVICE — NDL 23GA 1" 305145

## (undated) DEVICE — EYE DRSG PAD OVAL

## (undated) DEVICE — GLOVE PROTEXIS MICRO 7.0  2D73PM70

## (undated) DEVICE — ESU HIGH TEMP LOOP TIP AA03

## (undated) DEVICE — EYE KNIFE STILETTO VISITEC 1.1MM ANG 45DEG SIDEPORT 376620

## (undated) DEVICE — SU ETHILON 8-0 TG175-8 12" 1716G

## (undated) DEVICE — EYE SHIELD PLASTIC

## (undated) DEVICE — LINEN TOWEL PACK X5 5464

## (undated) DEVICE — PEN MARKING SKIN TYCO DEVON DUAL TIP 31145868

## (undated) DEVICE — EYE SPONGE SPEAR WECK CEL 0008685

## (undated) DEVICE — TAPE MICROPORE 1"X1.5YD 1530S-1

## (undated) DEVICE — EYE PREP BETADINE 5% SOLUTION 30ML 0065-0411-30

## (undated) DEVICE — SOL WATER 10ML VIAL 6332318510

## (undated) DEVICE — NDL 30GA 0.5" 305106

## (undated) DEVICE — SU VICRYL 7-0 TG140-8DA 18" J546G

## (undated) DEVICE — EYE NDL RETROBULBAR ATKINSON 25GA 1.5" 581637

## (undated) DEVICE — NDL BLUNT 18GA 1.5" FILTER 305211

## (undated) DEVICE — PACK MINOR EYE CUSTOM ASC

## (undated) DEVICE — SOL WATER IRRIG 500ML BOTTLE 2F7113

## (undated) DEVICE — GLOVE BIOGEL PI MICRO SZ 7.5 48575

## (undated) DEVICE — SU VICRYL 5-0 S-14DA 18" J571G

## (undated) DEVICE — PACK CATARACT CUSTOM ASC SEY15CPUMC

## (undated) DEVICE — APPLICATOR COTTON TIP 3" PKG OF 10 34831010

## (undated) DEVICE — ESU CORD BIPOLAR GREEN 10-4000

## (undated) DEVICE — APPLICATORS COTTON TIP 6"X2 STERILE LF C15053-006

## (undated) DEVICE — SU PLAIN 6-0 G-1DA 18" 770G

## (undated) DEVICE — SU PDS II 5-0 RB-2DA 30" Z148H

## (undated) DEVICE — DRSG GAUZE 4X4" 3033

## (undated) RX ORDER — ATROPINE SULFATE 10 MG/ML
SOLUTION/ DROPS OPHTHALMIC
Status: DISPENSED
Start: 2024-12-30

## (undated) RX ORDER — ACETAMINOPHEN 325 MG/1
TABLET ORAL
Status: DISPENSED
Start: 2025-03-19

## (undated) RX ORDER — FENTANYL CITRATE 50 UG/ML
INJECTION, SOLUTION INTRAMUSCULAR; INTRAVENOUS
Status: DISPENSED
Start: 2024-11-06

## (undated) RX ORDER — DEXMEDETOMIDINE HYDROCHLORIDE 4 UG/ML
INJECTION, SOLUTION INTRAVENOUS
Status: DISPENSED
Start: 2024-12-30

## (undated) RX ORDER — TRANEXAMIC ACID 100 MG/ML
INJECTION, SOLUTION INTRAVENOUS
Status: DISPENSED
Start: 2025-03-19

## (undated) RX ORDER — PROPOFOL 10 MG/ML
INJECTION, EMULSION INTRAVENOUS
Status: DISPENSED
Start: 2024-11-06

## (undated) RX ORDER — ACETAMINOPHEN 325 MG/1
TABLET ORAL
Status: DISPENSED
Start: 2024-11-06

## (undated) RX ORDER — PROPOFOL 10 MG/ML
INJECTION, EMULSION INTRAVENOUS
Status: DISPENSED
Start: 2024-12-30

## (undated) RX ORDER — ACETAMINOPHEN 325 MG/1
TABLET ORAL
Status: DISPENSED
Start: 2025-02-05

## (undated) RX ORDER — DEXMEDETOMIDINE HYDROCHLORIDE 4 UG/ML
INJECTION, SOLUTION INTRAVENOUS
Status: DISPENSED
Start: 2024-11-06

## (undated) RX ORDER — ACETAMINOPHEN 325 MG/1
TABLET ORAL
Status: DISPENSED
Start: 2024-12-30

## (undated) RX ORDER — TRANEXAMIC ACID 100 MG/ML
INJECTION, SOLUTION INTRAVENOUS
Status: DISPENSED
Start: 2024-11-06

## (undated) RX ORDER — BUPIVACAINE HYDROCHLORIDE 7.5 MG/ML
INJECTION, SOLUTION EPIDURAL; RETROBULBAR
Status: DISPENSED
Start: 2025-02-05